# Patient Record
Sex: FEMALE | Race: WHITE | NOT HISPANIC OR LATINO | URBAN - METROPOLITAN AREA
[De-identification: names, ages, dates, MRNs, and addresses within clinical notes are randomized per-mention and may not be internally consistent; named-entity substitution may affect disease eponyms.]

---

## 2020-07-30 ENCOUNTER — EMERGENCY (EMERGENCY)
Facility: HOSPITAL | Age: 65
LOS: 0 days | Discharge: HOME | End: 2020-07-30
Attending: EMERGENCY MEDICINE | Admitting: EMERGENCY MEDICINE
Payer: MEDICARE

## 2020-07-30 VITALS
TEMPERATURE: 98 F | RESPIRATION RATE: 18 BRPM | HEART RATE: 78 BPM | SYSTOLIC BLOOD PRESSURE: 160 MMHG | DIASTOLIC BLOOD PRESSURE: 82 MMHG | OXYGEN SATURATION: 98 %

## 2020-07-30 VITALS
TEMPERATURE: 98 F | OXYGEN SATURATION: 97 % | RESPIRATION RATE: 16 BRPM | HEIGHT: 62 IN | HEART RATE: 89 BPM | DIASTOLIC BLOOD PRESSURE: 96 MMHG | SYSTOLIC BLOOD PRESSURE: 179 MMHG | WEIGHT: 190.04 LBS
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
## 2020-07-30 DIAGNOSIS — Z94.1 HEART TRANSPLANT STATUS: Chronic | ICD-10-CM

## 2020-07-30 DIAGNOSIS — I25.10 ATHEROSCLEROTIC HEART DISEASE OF NATIVE CORONARY ARTERY WITHOUT ANGINA PECTORIS: ICD-10-CM

## 2020-07-30 DIAGNOSIS — E78.5 HYPERLIPIDEMIA, UNSPECIFIED: ICD-10-CM

## 2020-07-30 DIAGNOSIS — Z94.1 HEART TRANSPLANT STATUS: ICD-10-CM
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
## 2020-07-30 DIAGNOSIS — Z79.82 LONG TERM (CURRENT) USE OF ASPIRIN: ICD-10-CM

## 2020-07-30 DIAGNOSIS — Z79.02 LONG TERM (CURRENT) USE OF ANTITHROMBOTICS/ANTIPLATELETS: ICD-10-CM

## 2020-07-30 DIAGNOSIS — E03.9 HYPOTHYROIDISM, UNSPECIFIED: ICD-10-CM

## 2020-07-30 DIAGNOSIS — S80.12XA CONTUSION OF LEFT LOWER LEG, INITIAL ENCOUNTER: ICD-10-CM

## 2020-07-30 DIAGNOSIS — M25.471 EFFUSION, RIGHT ANKLE: ICD-10-CM

## 2020-07-30 DIAGNOSIS — Z79.52 LONG TERM (CURRENT) USE OF SYSTEMIC STEROIDS: ICD-10-CM

## 2020-07-30 PROCEDURE — 93970 EXTREMITY STUDY: CPT | Mod: 26

## 2020-07-30 PROCEDURE — 73630 X-RAY EXAM OF FOOT: CPT | Mod: 26,LT

## 2020-07-30 PROCEDURE — 99284 EMERGENCY DEPT VISIT MOD MDM: CPT

## 2020-07-30 PROCEDURE — 73590 X-RAY EXAM OF LOWER LEG: CPT | Mod: 26,LT

## 2020-07-30 RX ORDER — VALGANCICLOVIR 450 MG/1
2 TABLET, FILM COATED ORAL
Qty: 0 | Refills: 0 | DISCHARGE

## 2020-07-30 RX ORDER — MAGNESIUM OXIDE 400 MG ORAL TABLET 241.3 MG
1 TABLET ORAL
Qty: 0 | Refills: 0 | DISCHARGE

## 2020-07-30 RX ORDER — ESCITALOPRAM OXALATE 10 MG/1
1 TABLET, FILM COATED ORAL
Qty: 0 | Refills: 0 | DISCHARGE

## 2020-07-30 RX ORDER — TACROLIMUS 5 MG/1
2 CAPSULE ORAL
Qty: 0 | Refills: 0 | DISCHARGE

## 2020-07-30 RX ORDER — CLOPIDOGREL BISULFATE 75 MG/1
1 TABLET, FILM COATED ORAL
Qty: 0 | Refills: 0 | DISCHARGE

## 2020-07-30 RX ORDER — SIROLIMUS 1 MG/ML
2 SOLUTION ORAL
Qty: 0 | Refills: 0 | DISCHARGE

## 2020-07-30 RX ORDER — LEVOTHYROXINE SODIUM 125 MCG
1 TABLET ORAL
Qty: 0 | Refills: 0 | DISCHARGE

## 2020-07-30 RX ORDER — ASPIRIN/CALCIUM CARB/MAGNESIUM 324 MG
0 TABLET ORAL
Qty: 0 | Refills: 0 | DISCHARGE

## 2020-07-30 RX ORDER — ALPRAZOLAM 0.25 MG
1 TABLET ORAL
Qty: 0 | Refills: 0 | DISCHARGE

## 2020-07-30 NOTE — ED PROVIDER NOTE - CLINICAL SUMMARY MEDICAL DECISION MAKING FREE TEXT BOX
dvt, fx contemplated: unlikely after review of diagnostic testing. In my opinion, out patient treatment and follow up are appropriate.

## 2020-07-30 NOTE — ED PROVIDER NOTE - NSFOLLOWUPINSTRUCTIONS_ED_ALL_ED_FT
WHAT YOU NEED TO KNOW:    Leg edema is swelling caused by fluid buildup. Your legs may swell if you sit or stand for long periods of time, are pregnant, or are injured. Swelling may also occur if you have heart failure or circulation problems. This means that your heart does not pump blood through your body as it should.    DISCHARGE INSTRUCTIONS:    Self-care:     Elevate your legs: Raise your legs above the level of your heart as often as you can. This will help decrease swelling and pain. Prop your legs on pillows or blankets to keep them elevated comfortably.      Wear pressure stockings: These tight stockings put pressure on your legs to promote blood flow and prevent blood clots. Wear the stockings during the day. Do not wear them while you sleep.      Apply heat: Heat helps decrease pain and swelling. Apply heat on the area for 20 to 30 minutes every 2 hours for as many days as directed.       Stay active: Do not stand or sit for long periods of time. Ask your healthcare provider about the best exercise plan for you.      Eat healthy foods: Healthy foods include fruits, vegetables, whole-grain breads, low-fat dairy products, beans, lean meats, and fish. Ask if you need to be on a special diet. Limit salt. Salt will make your body hold even more fluid.    Follow up with your healthcare provider as directed: Write down your questions so you remember to ask them during your visits.     Contact your healthcare provider if:     You have a fever or feel more tired than usual.      The veins in your legs look larger than usual. They may look full or bulging.      Your legs itch or feel heavy.      You have red or white areas or sores on your legs. The skin may also appear dimpled or have indentations.      You are gaining weight.      You have trouble moving your ankles.      The swelling does not go away, or other parts of your body swell.      You have questions or concerns about your condition or care.    Return to the emergency department if:     You cannot walk.      You feel faint or confused.       Your skin turns blue or gray.      Your leg feels warm, tender, and painful. It may be swollen and red.      You have chest pain or trouble breathing that is worse when you lie down.      You suddenly feel lightheaded and have trouble breathing.      You have new and sudden chest pain. You may have more pain when you take deep breaths or cough. You may also cough up blood.

## 2020-07-30 NOTE — ED ADULT NURSE NOTE - NSIMPLEMENTINTERV_GEN_ALL_ED
Implemented All Universal Safety Interventions:  [place] to call system. Call bell, personal items and telephone within reach. Instruct patient to call for assistance. Room bathroom lighting operational. Non-slip footwear when patient is off stretcher. Physically safe environment: no spills, clutter or unnecessary equipment. Stretcher in lowest position, wheels locked, appropriate side rails in place.

## 2020-07-30 NOTE — ED PROVIDER NOTE - PHYSICAL EXAMINATION
msk: +sweling to left lateral lower leg, no bony tenderness at ankle . +TTP to left metatarsal base. good rom of toes. pulses in tact PT/DP. achilles in tact. no proximal tibial tenderness. good weight bearing    skin: +contusion to left lateral lower leg

## 2020-07-30 NOTE — ED PROVIDER NOTE - PATIENT PORTAL LINK FT
You can access the FollowMyHealth Patient Portal offered by [facility] by registering at the following website: [email or web address]. By joining [employer]’s FollowMyHealth portal, you will also be able to view your health information using other applications (apps) compatible with our system.

## 2020-07-30 NOTE — ED PROVIDER NOTE - OBJECTIVE STATEMENT
66 y/o female with hx of cardiac transplant presents with left leg swelling and pain. patient states symptoms worse over 4 days. patient denies any trauma. patient c/o throbbing pain. patient seen by pmd and sent to the Ed for evaluation . patient denies any tingling to toes. patient with hx of metatarsal fracture in past. patient deneis any cp, sob, fevers, rashes, streaking up legs. no groin pain . patient able to ambulate without difficulty .

## 2020-07-30 NOTE — ED PROVIDER NOTE - NS ED ROS FT
Review of Systems    Constitutional: (-) fever or chills  respiratory: (-) cough (-) shortness of breath  Cardiovascular: (-) syncope, palpitations or chest pain  Integumentary: (-) rash or painful lymph nodes  Neurological: (-) altered mental status, headache or head injury  msk: left leg swelling

## 2020-07-30 NOTE — ED PROVIDER NOTE - IMAGING STUDIES ADDITIONAL INFORMATION FREE TEXT
I personally reviewed the radiographs performed on this patient and used my review in my medical decision making. no fx.

## 2023-01-29 NOTE — ED ADULT NURSE NOTE - ALCOHOL PRE SCREEN (AUDIT - C)
Patient with dizziness, vision changes, feeling better at time of exam, concern for a posterior CVA, we will admit to telemetry with stroke protocol, obtain MRI, obtain echocardiogram and carotid ultrasound, consult Neurology, neuro checks and vital sign checks per stroke orders, NPO until passes bedside swallow, PT OT ST evaluation, give 325 mg aspirin now and 81 mg aspirin daily, start atorvastatin once daily, labetalol for blood pressure management, obtain lipid panel, hemoglobin A1c in a.m.     Statement Selected

## 2023-11-28 PROBLEM — F41.9 ANXIETY DISORDER, UNSPECIFIED: Chronic | Status: ACTIVE | Noted: 2020-07-30

## 2023-11-28 PROBLEM — I25.10 ATHEROSCLEROTIC HEART DISEASE OF NATIVE CORONARY ARTERY WITHOUT ANGINA PECTORIS: Chronic | Status: ACTIVE | Noted: 2020-07-30

## 2023-11-28 PROBLEM — E03.9 HYPOTHYROIDISM, UNSPECIFIED: Chronic | Status: ACTIVE | Noted: 2020-07-30

## 2023-11-28 PROBLEM — E78.5 HYPERLIPIDEMIA, UNSPECIFIED: Chronic | Status: ACTIVE | Noted: 2020-07-30

## 2023-11-29 ENCOUNTER — OUTPATIENT (OUTPATIENT)
Dept: OUTPATIENT SERVICES | Facility: HOSPITAL | Age: 68
LOS: 1 days | End: 2023-11-29
Payer: MEDICARE

## 2023-11-29 DIAGNOSIS — K86.2 CYST OF PANCREAS: ICD-10-CM

## 2023-11-29 DIAGNOSIS — Z94.1 HEART TRANSPLANT STATUS: Chronic | ICD-10-CM

## 2023-11-29 DIAGNOSIS — Z00.8 ENCOUNTER FOR OTHER GENERAL EXAMINATION: ICD-10-CM

## 2023-11-29 PROCEDURE — 74183 MRI ABD W/O CNTR FLWD CNTR: CPT

## 2023-11-29 PROCEDURE — A9579: CPT

## 2023-11-29 PROCEDURE — 74183 MRI ABD W/O CNTR FLWD CNTR: CPT | Mod: 26,MH

## 2023-11-30 DIAGNOSIS — K86.2 CYST OF PANCREAS: ICD-10-CM

## 2023-12-02 ENCOUNTER — TRANSCRIPTION ENCOUNTER (OUTPATIENT)
Age: 68
End: 2023-12-02

## 2024-10-10 ENCOUNTER — INPATIENT (INPATIENT)
Facility: HOSPITAL | Age: 69
LOS: 3 days | Discharge: HOME | DRG: 853 | End: 2024-10-14
Attending: INTERNAL MEDICINE | Admitting: UROLOGY
Payer: MEDICARE

## 2024-10-10 VITALS
OXYGEN SATURATION: 97 % | TEMPERATURE: 99 F | HEART RATE: 124 BPM | RESPIRATION RATE: 20 BRPM | WEIGHT: 169.98 LBS | SYSTOLIC BLOOD PRESSURE: 83 MMHG | HEIGHT: 61 IN | DIASTOLIC BLOOD PRESSURE: 53 MMHG

## 2024-10-10 DIAGNOSIS — Z94.1 HEART TRANSPLANT STATUS: Chronic | ICD-10-CM

## 2024-10-10 LAB
CA-I SERPL-SCNC: 1.12 MMOL/L — LOW (ref 1.15–1.33)
GAS PNL BLDV: 135 MMOL/L — LOW (ref 136–145)
GAS PNL BLDV: SIGNIFICANT CHANGE UP
HCO3 BLDV-SCNC: 25 MMOL/L — SIGNIFICANT CHANGE UP (ref 22–29)
HCT VFR BLD CALC: 45.1 % — SIGNIFICANT CHANGE UP (ref 37–47)
HCT VFR BLDA CALC: 45 % — SIGNIFICANT CHANGE UP (ref 34.5–46.5)
HGB BLD CALC-MCNC: 14.9 G/DL — SIGNIFICANT CHANGE UP (ref 11.7–16.1)
HGB BLD-MCNC: 14.4 G/DL — SIGNIFICANT CHANGE UP (ref 12–16)
LACTATE BLDV-MCNC: 3.3 MMOL/L — HIGH (ref 0.5–2)
MCHC RBC-ENTMCNC: 29.4 PG — SIGNIFICANT CHANGE UP (ref 27–31)
MCHC RBC-ENTMCNC: 31.9 G/DL — LOW (ref 32–37)
MCV RBC AUTO: 92 FL — SIGNIFICANT CHANGE UP (ref 81–99)
PCO2 BLDV: 43 MMHG — HIGH (ref 39–42)
PH BLDV: 7.38 — SIGNIFICANT CHANGE UP (ref 7.32–7.43)
PLATELET # BLD AUTO: 158 K/UL — SIGNIFICANT CHANGE UP (ref 130–400)
PMV BLD: 9.9 FL — SIGNIFICANT CHANGE UP (ref 7.4–10.4)
PO2 BLDV: 24 MMHG — LOW (ref 25–45)
POTASSIUM BLDV-SCNC: 3.6 MMOL/L — SIGNIFICANT CHANGE UP (ref 3.5–5.1)
RBC # BLD: 4.9 M/UL — SIGNIFICANT CHANGE UP (ref 4.2–5.4)
RBC # FLD: 13.2 % — SIGNIFICANT CHANGE UP (ref 11.5–14.5)
SAO2 % BLDV: 39.5 % — LOW (ref 67–88)
WBC # BLD: 2.59 K/UL — LOW (ref 4.8–10.8)
WBC # FLD AUTO: 2.59 K/UL — LOW (ref 4.8–10.8)

## 2024-10-10 PROCEDURE — 93010 ELECTROCARDIOGRAM REPORT: CPT

## 2024-10-10 PROCEDURE — 99285 EMERGENCY DEPT VISIT HI MDM: CPT | Mod: FS

## 2024-10-10 RX ORDER — SODIUM CHLORIDE 0.9 % (FLUSH) 0.9 %
2100 SYRINGE (ML) INJECTION ONCE
Refills: 0 | Status: COMPLETED | OUTPATIENT
Start: 2024-10-10 | End: 2024-10-10

## 2024-10-10 RX ORDER — PIPERACILLIN SODIUM AND TAZOBACTAM SODIUM 12; 1.5 G/60ML; G/60ML
3.38 INJECTION, POWDER, LYOPHILIZED, FOR SOLUTION INTRAVENOUS ONCE
Refills: 0 | Status: COMPLETED | OUTPATIENT
Start: 2024-10-10 | End: 2024-10-10

## 2024-10-10 RX ORDER — ACETAMINOPHEN 325 MG
975 TABLET ORAL ONCE
Refills: 0 | Status: COMPLETED | OUTPATIENT
Start: 2024-10-10 | End: 2024-10-10

## 2024-10-11 DIAGNOSIS — A41.9 SEPSIS, UNSPECIFIED ORGANISM: ICD-10-CM

## 2024-10-11 LAB
ALBUMIN SERPL ELPH-MCNC: 3.8 G/DL — SIGNIFICANT CHANGE UP (ref 3.5–5.2)
ALP SERPL-CCNC: 105 U/L — SIGNIFICANT CHANGE UP (ref 30–115)
ALT FLD-CCNC: 19 U/L — SIGNIFICANT CHANGE UP (ref 0–41)
ANION GAP SERPL CALC-SCNC: 14 MMOL/L — SIGNIFICANT CHANGE UP (ref 7–14)
APPEARANCE UR: ABNORMAL
APTT BLD: 26.4 SEC — LOW (ref 27–39.2)
AST SERPL-CCNC: 25 U/L — SIGNIFICANT CHANGE UP (ref 0–41)
BACTERIA # UR AUTO: ABNORMAL /HPF
BASOPHILS # BLD AUTO: 0.01 K/UL — SIGNIFICANT CHANGE UP (ref 0–0.2)
BASOPHILS NFR BLD AUTO: 0.4 % — SIGNIFICANT CHANGE UP (ref 0–1)
BILIRUB SERPL-MCNC: 1.3 MG/DL — HIGH (ref 0.2–1.2)
BILIRUB UR-MCNC: NEGATIVE — SIGNIFICANT CHANGE UP
BLD GP AB SCN SERPL QL: SIGNIFICANT CHANGE UP
BUN SERPL-MCNC: 19 MG/DL — SIGNIFICANT CHANGE UP (ref 10–20)
CALCIUM SERPL-MCNC: 8.8 MG/DL — SIGNIFICANT CHANGE UP (ref 8.4–10.5)
CHLORIDE SERPL-SCNC: 103 MMOL/L — SIGNIFICANT CHANGE UP (ref 98–110)
CO2 SERPL-SCNC: 22 MMOL/L — SIGNIFICANT CHANGE UP (ref 17–32)
COLOR SPEC: YELLOW — SIGNIFICANT CHANGE UP
CREAT SERPL-MCNC: 1.1 MG/DL — SIGNIFICANT CHANGE UP (ref 0.7–1.5)
DIFF PNL FLD: ABNORMAL
E COLI DNA BLD POS QL NAA+NON-PROBE: SIGNIFICANT CHANGE UP
EGFR: 55 ML/MIN/1.73M2 — LOW
EOSINOPHIL # BLD AUTO: 0.01 K/UL — SIGNIFICANT CHANGE UP (ref 0–0.7)
EOSINOPHIL NFR BLD AUTO: 0.4 % — SIGNIFICANT CHANGE UP (ref 0–8)
EPI CELLS # UR: PRESENT
FLUAV AG NPH QL: SIGNIFICANT CHANGE UP
FLUBV AG NPH QL: SIGNIFICANT CHANGE UP
GLUCOSE SERPL-MCNC: 168 MG/DL — HIGH (ref 70–99)
GLUCOSE UR QL: NEGATIVE MG/DL — SIGNIFICANT CHANGE UP
GRAM STN FLD: ABNORMAL
IMM GRANULOCYTES NFR BLD AUTO: 0.4 % — HIGH (ref 0.1–0.3)
INR BLD: 1.24 RATIO — SIGNIFICANT CHANGE UP (ref 0.65–1.3)
KETONES UR-MCNC: NEGATIVE MG/DL — SIGNIFICANT CHANGE UP
LACTATE SERPL-SCNC: 2.2 MMOL/L — HIGH (ref 0.7–2)
LACTATE SERPL-SCNC: 3.1 MMOL/L — HIGH (ref 0.7–2)
LEUKOCYTE ESTERASE UR-ACNC: ABNORMAL
LIDOCAIN IGE QN: 17 U/L — SIGNIFICANT CHANGE UP (ref 7–60)
LYMPHOCYTES # BLD AUTO: 0.32 K/UL — LOW (ref 1.2–3.4)
LYMPHOCYTES # BLD AUTO: 12.4 % — LOW (ref 20.5–51.1)
METHOD TYPE: SIGNIFICANT CHANGE UP
MONOCYTES # BLD AUTO: 0.02 K/UL — LOW (ref 0.1–0.6)
MONOCYTES NFR BLD AUTO: 0.8 % — LOW (ref 1.7–9.3)
NEUTROPHILS # BLD AUTO: 2.22 K/UL — SIGNIFICANT CHANGE UP (ref 1.4–6.5)
NEUTROPHILS NFR BLD AUTO: 85.6 % — HIGH (ref 42.2–75.2)
NITRITE UR-MCNC: POSITIVE
NRBC # BLD: 0 /100 WBCS — SIGNIFICANT CHANGE UP (ref 0–0)
PH UR: 5.5 — SIGNIFICANT CHANGE UP (ref 5–8)
POTASSIUM SERPL-MCNC: 4.3 MMOL/L — SIGNIFICANT CHANGE UP (ref 3.5–5)
POTASSIUM SERPL-SCNC: 4.3 MMOL/L — SIGNIFICANT CHANGE UP (ref 3.5–5)
PROT SERPL-MCNC: 6.2 G/DL — SIGNIFICANT CHANGE UP (ref 6–8)
PROT UR-MCNC: 30 MG/DL
PROTHROM AB SERPL-ACNC: 14.2 SEC — HIGH (ref 9.95–12.87)
RBC CASTS # UR COMP ASSIST: >50 /HPF — HIGH (ref 0–4)
RSV RNA NPH QL NAA+NON-PROBE: SIGNIFICANT CHANGE UP
SARS-COV-2 RNA SPEC QL NAA+PROBE: SIGNIFICANT CHANGE UP
SODIUM SERPL-SCNC: 139 MMOL/L — SIGNIFICANT CHANGE UP (ref 135–146)
SP GR SPEC: >1.03 — HIGH (ref 1–1.03)
SPECIMEN SOURCE: SIGNIFICANT CHANGE UP
SPECIMEN SOURCE: SIGNIFICANT CHANGE UP
SQUAMOUS # UR AUTO: SIGNIFICANT CHANGE UP /HPF (ref 0–5)
UROBILINOGEN FLD QL: 0.2 MG/DL — SIGNIFICANT CHANGE UP (ref 0.2–1)
WBC UR QL: 29 /HPF — HIGH (ref 0–5)

## 2024-10-11 PROCEDURE — 85730 THROMBOPLASTIN TIME PARTIAL: CPT

## 2024-10-11 PROCEDURE — 86664 EPSTEIN-BARR NUCLEAR ANTIGEN: CPT

## 2024-10-11 PROCEDURE — 87186 SC STD MICRODIL/AGAR DIL: CPT

## 2024-10-11 PROCEDURE — 86901 BLOOD TYPING SEROLOGIC RH(D): CPT

## 2024-10-11 PROCEDURE — 74177 CT ABD & PELVIS W/CONTRAST: CPT | Mod: 26,MC

## 2024-10-11 PROCEDURE — 52332 CYSTOSCOPY AND TREATMENT: CPT | Mod: RT

## 2024-10-11 PROCEDURE — 86663 EPSTEIN-BARR ANTIBODY: CPT

## 2024-10-11 PROCEDURE — C1769: CPT

## 2024-10-11 PROCEDURE — 84100 ASSAY OF PHOSPHORUS: CPT

## 2024-10-11 PROCEDURE — 72170 X-RAY EXAM OF PELVIS: CPT

## 2024-10-11 PROCEDURE — 87086 URINE CULTURE/COLONY COUNT: CPT

## 2024-10-11 PROCEDURE — 86777 TOXOPLASMA ANTIBODY: CPT

## 2024-10-11 PROCEDURE — 83605 ASSAY OF LACTIC ACID: CPT

## 2024-10-11 PROCEDURE — 93306 TTE W/DOPPLER COMPLETE: CPT

## 2024-10-11 PROCEDURE — 85610 PROTHROMBIN TIME: CPT

## 2024-10-11 PROCEDURE — 80053 COMPREHEN METABOLIC PANEL: CPT

## 2024-10-11 PROCEDURE — 81001 URINALYSIS AUTO W/SCOPE: CPT

## 2024-10-11 PROCEDURE — 71045 X-RAY EXAM CHEST 1 VIEW: CPT

## 2024-10-11 PROCEDURE — 86644 CMV ANTIBODY: CPT

## 2024-10-11 PROCEDURE — 99223 1ST HOSP IP/OBS HIGH 75: CPT | Mod: FS

## 2024-10-11 PROCEDURE — 83735 ASSAY OF MAGNESIUM: CPT

## 2024-10-11 PROCEDURE — 86665 EPSTEIN-BARR CAPSID VCA: CPT

## 2024-10-11 PROCEDURE — 99221 1ST HOSP IP/OBS SF/LOW 40: CPT

## 2024-10-11 PROCEDURE — 85025 COMPLETE CBC W/AUTO DIFF WBC: CPT

## 2024-10-11 PROCEDURE — 86900 BLOOD TYPING SEROLOGIC ABO: CPT

## 2024-10-11 PROCEDURE — 86850 RBC ANTIBODY SCREEN: CPT

## 2024-10-11 PROCEDURE — 71045 X-RAY EXAM CHEST 1 VIEW: CPT | Mod: 26

## 2024-10-11 PROCEDURE — C2617: CPT

## 2024-10-11 PROCEDURE — 36415 COLL VENOUS BLD VENIPUNCTURE: CPT

## 2024-10-11 PROCEDURE — 80197 ASSAY OF TACROLIMUS: CPT

## 2024-10-11 PROCEDURE — 85027 COMPLETE CBC AUTOMATED: CPT

## 2024-10-11 RX ORDER — SEMAGLUTIDE 1.34 MG/ML
1 INJECTION, SOLUTION SUBCUTANEOUS
Refills: 0 | DISCHARGE

## 2024-10-11 RX ORDER — ONDANSETRON HCL/PF 4 MG/2 ML
4 VIAL (ML) INJECTION EVERY 6 HOURS
Refills: 0 | Status: DISCONTINUED | OUTPATIENT
Start: 2024-10-11 | End: 2024-10-11

## 2024-10-11 RX ORDER — PIPERACILLIN SODIUM AND TAZOBACTAM SODIUM 12; 1.5 G/60ML; G/60ML
3.38 INJECTION, POWDER, LYOPHILIZED, FOR SOLUTION INTRAVENOUS EVERY 8 HOURS
Refills: 0 | Status: DISCONTINUED | OUTPATIENT
Start: 2024-10-11 | End: 2024-10-13

## 2024-10-11 RX ORDER — TACROLIMUS 4 MG/1
1.5 TABLET, EXTENDED RELEASE ORAL
Refills: 0 | DISCHARGE

## 2024-10-11 RX ORDER — FAMOTIDINE 40 MG
1 TABLET ORAL
Refills: 0 | DISCHARGE

## 2024-10-11 RX ORDER — SODIUM CHLORIDE 0.9 % (FLUSH) 0.9 %
1000 SYRINGE (ML) INJECTION
Refills: 0 | Status: DISCONTINUED | OUTPATIENT
Start: 2024-10-11 | End: 2024-10-12

## 2024-10-11 RX ORDER — SODIUM CHLORIDE 0.9 % (FLUSH) 0.9 %
1000 SYRINGE (ML) INJECTION
Refills: 0 | Status: DISCONTINUED | OUTPATIENT
Start: 2024-10-11 | End: 2024-10-11

## 2024-10-11 RX ORDER — MYCOPHENOLATE MOFETIL 500 MG/1
540 TABLET ORAL
Refills: 0 | Status: DISCONTINUED | OUTPATIENT
Start: 2024-10-11 | End: 2024-10-11

## 2024-10-11 RX ORDER — AMLODIPINE BESYLATE 5 MG
1 TABLET ORAL
Refills: 0 | DISCHARGE

## 2024-10-11 RX ORDER — ROSUVASTATIN CALCIUM 20 MG/1
1 TABLET, COATED ORAL
Refills: 0 | DISCHARGE

## 2024-10-11 RX ORDER — ASPIRIN 325 MG
81 TABLET ORAL DAILY
Refills: 0 | Status: DISCONTINUED | OUTPATIENT
Start: 2024-10-11 | End: 2024-10-11

## 2024-10-11 RX ORDER — SODIUM CHLORIDE IRRIG SOLUTION 0.9 %
1000 SOLUTION, IRRIGATION IRRIGATION
Refills: 0 | Status: DISCONTINUED | OUTPATIENT
Start: 2024-10-11 | End: 2024-10-11

## 2024-10-11 RX ORDER — CEFTRIAXONE SODIUM 1 G
1000 VIAL (EA) INJECTION EVERY 24 HOURS
Refills: 0 | Status: DISCONTINUED | OUTPATIENT
Start: 2024-10-11 | End: 2024-10-11

## 2024-10-11 RX ORDER — ESCITALOPRAM OXALATE 10 MG
10 TABLET ORAL DAILY
Refills: 0 | Status: DISCONTINUED | OUTPATIENT
Start: 2024-10-11 | End: 2024-10-11

## 2024-10-11 RX ORDER — ASPIRIN 325 MG
81 TABLET ORAL DAILY
Refills: 0 | Status: DISCONTINUED | OUTPATIENT
Start: 2024-10-11 | End: 2024-10-14

## 2024-10-11 RX ORDER — MORPHINE SULFATE 30 MG/1
2 TABLET, FILM COATED, EXTENDED RELEASE ORAL
Refills: 0 | Status: DISCONTINUED | OUTPATIENT
Start: 2024-10-11 | End: 2024-10-11

## 2024-10-11 RX ORDER — ATORVASTATIN CALCIUM 10 MG/1
10 TABLET, FILM COATED ORAL AT BEDTIME
Refills: 0 | Status: DISCONTINUED | OUTPATIENT
Start: 2024-10-11 | End: 2024-10-14

## 2024-10-11 RX ORDER — ONDANSETRON HCL/PF 4 MG/2 ML
4 VIAL (ML) INJECTION ONCE
Refills: 0 | Status: DISCONTINUED | OUTPATIENT
Start: 2024-10-11 | End: 2024-10-11

## 2024-10-11 RX ORDER — CHLORHEXIDINE GLUCONATE ORAL RINSE 1.2 MG/ML
1 SOLUTION DENTAL
Refills: 0 | Status: DISCONTINUED | OUTPATIENT
Start: 2024-10-11 | End: 2024-10-14

## 2024-10-11 RX ORDER — TACROLIMUS 4 MG/1
1.5 TABLET, EXTENDED RELEASE ORAL
Refills: 0 | Status: DISCONTINUED | OUTPATIENT
Start: 2024-10-11 | End: 2024-10-14

## 2024-10-11 RX ORDER — MYCOPHENOLIC ACID 180 MG/1
540 TABLET, DELAYED RELEASE ORAL
Refills: 0 | Status: DISCONTINUED | OUTPATIENT
Start: 2024-10-11 | End: 2024-10-14

## 2024-10-11 RX ORDER — SODIUM CHLORIDE 0.9 % (FLUSH) 0.9 %
1000 SYRINGE (ML) INJECTION ONCE
Refills: 0 | Status: COMPLETED | OUTPATIENT
Start: 2024-10-11 | End: 2024-10-11

## 2024-10-11 RX ORDER — ALPRAZOLAM 0.5 MG/1
0.25 TABLET ORAL ONCE
Refills: 0 | Status: DISCONTINUED | OUTPATIENT
Start: 2024-10-11 | End: 2024-10-11

## 2024-10-11 RX ORDER — HYDROCORTISONE 5 MG/1
100 TABLET ORAL THREE TIMES A DAY
Refills: 0 | Status: DISCONTINUED | OUTPATIENT
Start: 2024-10-11 | End: 2024-10-13

## 2024-10-11 RX ORDER — HYDROMORPHONE HYDROCHLORIDE 1 MG/ML
0.5 INJECTION, SOLUTION INTRAMUSCULAR; INTRAVENOUS; SUBCUTANEOUS
Refills: 0 | Status: DISCONTINUED | OUTPATIENT
Start: 2024-10-11 | End: 2024-10-11

## 2024-10-11 RX ORDER — FAMOTIDINE 40 MG
20 TABLET ORAL DAILY
Refills: 0 | Status: DISCONTINUED | OUTPATIENT
Start: 2024-10-11 | End: 2024-10-14

## 2024-10-11 RX ORDER — ACETAMINOPHEN 325 MG
650 TABLET ORAL EVERY 6 HOURS
Refills: 0 | Status: DISCONTINUED | OUTPATIENT
Start: 2024-10-11 | End: 2024-10-11

## 2024-10-11 RX ORDER — PREDNISONE 5 MG/1
2.5 TABLET ORAL DAILY
Refills: 0 | Status: DISCONTINUED | OUTPATIENT
Start: 2024-10-11 | End: 2024-10-11

## 2024-10-11 RX ORDER — ROSUVASTATIN CALCIUM 20 MG/1
20 TABLET, COATED ORAL AT BEDTIME
Refills: 0 | Status: DISCONTINUED | OUTPATIENT
Start: 2024-10-11 | End: 2024-10-14

## 2024-10-11 RX ORDER — SIROLIMUS 1 MG/1
2 TABLET, FILM COATED ORAL DAILY
Refills: 0 | Status: DISCONTINUED | OUTPATIENT
Start: 2024-10-11 | End: 2024-10-11

## 2024-10-11 RX ORDER — MYCOPHENOLATE MOFETIL 500 MG/1
540 TABLET ORAL
Refills: 0 | DISCHARGE

## 2024-10-11 RX ADMIN — Medication 5000 UNIT(S): at 06:13

## 2024-10-11 RX ADMIN — Medication 975 MILLIGRAM(S): at 00:17

## 2024-10-11 RX ADMIN — PIPERACILLIN SODIUM AND TAZOBACTAM SODIUM 25 GRAM(S): 12; 1.5 INJECTION, POWDER, LYOPHILIZED, FOR SOLUTION INTRAVENOUS at 06:12

## 2024-10-11 RX ADMIN — Medication 2100 MILLILITER(S): at 01:20

## 2024-10-11 RX ADMIN — Medication 20 MILLIGRAM(S): at 12:47

## 2024-10-11 RX ADMIN — HYDROCORTISONE 100 MILLIGRAM(S): 5 TABLET ORAL at 21:27

## 2024-10-11 RX ADMIN — PIPERACILLIN SODIUM AND TAZOBACTAM SODIUM 25 GRAM(S): 12; 1.5 INJECTION, POWDER, LYOPHILIZED, FOR SOLUTION INTRAVENOUS at 21:27

## 2024-10-11 RX ADMIN — Medication 137 MICROGRAM(S): at 06:13

## 2024-10-11 RX ADMIN — MYCOPHENOLIC ACID 540 MILLIGRAM(S): 180 TABLET, DELAYED RELEASE ORAL at 16:56

## 2024-10-11 RX ADMIN — PIPERACILLIN SODIUM AND TAZOBACTAM SODIUM 200 GRAM(S): 12; 1.5 INJECTION, POWDER, LYOPHILIZED, FOR SOLUTION INTRAVENOUS at 00:18

## 2024-10-11 RX ADMIN — TACROLIMUS 1.5 MILLIGRAM(S): 4 TABLET, EXTENDED RELEASE ORAL at 16:56

## 2024-10-11 RX ADMIN — Medication 100 MILLILITER(S): at 08:05

## 2024-10-11 RX ADMIN — Medication 81 MILLIGRAM(S): at 12:48

## 2024-10-11 RX ADMIN — HYDROCORTISONE 100 MILLIGRAM(S): 5 TABLET ORAL at 13:21

## 2024-10-11 RX ADMIN — Medication 2100 MILLILITER(S): at 00:18

## 2024-10-11 RX ADMIN — ROSUVASTATIN CALCIUM 20 MILLIGRAM(S): 20 TABLET, COATED ORAL at 21:27

## 2024-10-11 RX ADMIN — PIPERACILLIN SODIUM AND TAZOBACTAM SODIUM 25 GRAM(S): 12; 1.5 INJECTION, POWDER, LYOPHILIZED, FOR SOLUTION INTRAVENOUS at 13:20

## 2024-10-11 RX ADMIN — Medication 75 MILLILITER(S): at 03:42

## 2024-10-11 RX ADMIN — Medication 5000 UNIT(S): at 16:56

## 2024-10-11 RX ADMIN — HYDROCORTISONE 100 MILLIGRAM(S): 5 TABLET ORAL at 06:13

## 2024-10-11 RX ADMIN — CHLORHEXIDINE GLUCONATE ORAL RINSE 1 APPLICATION(S): 1.2 SOLUTION DENTAL at 06:12

## 2024-10-11 RX ADMIN — Medication 100 MILLILITER(S): at 20:03

## 2024-10-11 RX ADMIN — Medication 1000 MILLILITER(S): at 02:01

## 2024-10-11 NOTE — CONSULT NOTE ADULT - SUBJECTIVE AND OBJECTIVE BOX
CARDIOLOGY CONSULT NOTE     CHIEF COMPLAINT/REASON FOR CONSULT:    HPI:  69-year-old female PMH CAD S/p heart transplant 7 years ago, HTN,  anxiety, hypothyroidism, Hysterectomy, HLD presenting to ED for evaluation of generalized weakness, fever, chills, nausea, vomiting, abdominal pain over the past 2 days.  Son reporting patient has been increasingly weak.  Denies chest pain, shortness of breath and hematuria.  Pt admits to dysuria.  (11 Oct 2024 01:40)      PAST MEDICAL & SURGICAL HISTORY:  Anxiety      Coronary artery disease      Hyperlipidemia      Hypothyroid      H/O heart transplant          Cardiac Risks:   [ ]HTN, [ ] DM, [ ] Smoking, [ ] FH,  [ ] Lipids        MEDICATIONS:  MEDICATIONS  (STANDING):  aspirin  chewable 81 milliGRAM(s) Oral daily  atorvastatin 10 milliGRAM(s) Oral at bedtime  chlorhexidine 2% Cloths 1 Application(s) Topical <User Schedule>  clopidogrel Tablet 75 milliGRAM(s) Oral daily  escitalopram 10 milliGRAM(s) Oral daily  heparin   Injectable 5000 Unit(s) SubCutaneous every 12 hours  hydrocortisone sodium succinate Injectable 100 milliGRAM(s) IV Push three times a day  levothyroxine 137 MICROGram(s) Oral daily  piperacillin/tazobactam IVPB.. 3.375 Gram(s) IV Intermittent every 8 hours  sirolimus 2 milliGRAM(s) Oral daily  sodium chloride 0.9%. 1000 milliLiter(s) (100 mL/Hr) IV Continuous <Continuous>      FAMILY HISTORY:      SOCIAL HISTORY:      [ ] Marital status  Allergies    No Known Allergies    Intolerances    	    REVIEW OF SYSTEMS:  CONSTITUTIONAL: No fever, weight loss, or fatigue  EYES: No eye pain, visual disturbances, or discharge  ENMT:  No difficulty hearing, tinnitus, vertigo; No sinus or throat pain  NECK: No pain or stiffness  RESPIRATORY: No cough, wheezing, chills or hemoptysis; No Shortness of Breath  CARDIOVASCULAR: No chest pain, palpitations, passing out, dizziness, or leg swelling  GASTROINTESTINAL: No abdominal or epigastric pain. No nausea, vomiting, or hematemesis; No diarrhea or constipation. No melena or hematochezia.  GENITOURINARY: No dysuria, frequency, hematuria, or incontinence  NEUROLOGICAL: No headaches, memory loss, loss of strength, numbness, or tremors  SKIN: No itching, burning, rashes, or lesions   	        PHYSICAL EXAM:  T(C): 36.7 (10-11-24 @ 07:13), Max: 39.8 (10-10-24 @ 23:31)  HR: 95 (10-11-24 @ 05:00) (89 - 124)  BP: 89/52 (10-11-24 @ 05:00) (64/33 - 143/67)  RR: 23 (10-11-24 @ 07:13) (16 - 23)  SpO2: 96% (10-11-24 @ 07:13) (95% - 98%)  Wt(kg): --  I&O's Summary    10 Oct 2024 07:01  -  11 Oct 2024 07:00  --------------------------------------------------------  IN: 0 mL / OUT: 550 mL / NET: -550 mL    11 Oct 2024 07:01  -  11 Oct 2024 08:13  --------------------------------------------------------  IN: 0 mL / OUT: 200 mL / NET: -200 mL        Appearance: Normal	  Psychiatry: A & O x 3, Mood & affect appropriate  HEENT:   Normal oral mucosa, PERRL, EOMI	  Lymphatic: No lymphadenopathy  Cardiovascular: Normal S1 S2,RRR, No JVD, No murmurs  Respiratory: Lungs clear to auscultation	  Gastrointestinal:  Soft, Non-tender, + BS	  Skin: No rashes, No ecchymoses, No cyanosis	  Neurologic: Non-focal  Extremities: Normal range of motion, No clubbing, cyanosis or edema  Vascular: Peripheral pulses palpable 2+ bilaterally      ECG:  CARDIOLOGY CONSULT NOTE     CHIEF COMPLAINT/REASON FOR CONSULT:    HPI:  69-year-old female PMH CAD S/p heart transplant 7 years ago, HTN,  anxiety, hypothyroidism, Hysterectomy, HLD presenting to ED for evaluation of generalized weakness, fever, chills, nausea, vomiting, abdominal pain over the past 2 days.  Son reporting patient has been increasingly weak.  Denies chest pain, shortness of breath and hematuria.  Pt admits to dysuria.  (11 Oct 2024 01:40)      PAST MEDICAL & SURGICAL HISTORY:  Anxiety      Coronary artery disease      Hyperlipidemia      Hypothyroid      H/O heart transplant          Cardiac Risks:   [ ]HTN, [ ] DM, [ ] Smoking, [ ] FH,  [x ] Lipids        MEDICATIONS:  MEDICATIONS  (STANDING):  aspirin  chewable 81 milliGRAM(s) Oral daily  atorvastatin 10 milliGRAM(s) Oral at bedtime  chlorhexidine 2% Cloths 1 Application(s) Topical <User Schedule>  clopidogrel Tablet 75 milliGRAM(s) Oral daily  escitalopram 10 milliGRAM(s) Oral daily  heparin   Injectable 5000 Unit(s) SubCutaneous every 12 hours  hydrocortisone sodium succinate Injectable 100 milliGRAM(s) IV Push three times a day  levothyroxine 137 MICROGram(s) Oral daily  piperacillin/tazobactam IVPB.. 3.375 Gram(s) IV Intermittent every 8 hours  sirolimus 2 milliGRAM(s) Oral daily  sodium chloride 0.9%. 1000 milliLiter(s) (100 mL/Hr) IV Continuous <Continuous>      FAMILY HISTORY:      SOCIAL HISTORY:        Allergies    No Known Allergies        	    REVIEW OF SYSTEMS:  CONSTITUTIONAL: No fever, weight loss, or fatigue  EYES: No eye pain, visual disturbances, or discharge  ENMT:  No difficulty hearing, tinnitus, vertigo; No sinus or throat pain  NECK: No pain or stiffness  RESPIRATORY: No cough, wheezing, chills or hemoptysis; No Shortness of Breath  CARDIOVASCULAR: No chest pain, palpitations, passing out, dizziness, or leg swelling  GASTROINTESTINAL: No abdominal or epigastric pain. No nausea, vomiting, or hematemesis; No diarrhea or constipation. No melena or hematochezia.  GENITOURINARY: No dysuria, frequency, hematuria, or incontinence  NEUROLOGICAL: No headaches, memory loss, loss of strength, numbness, or tremors  SKIN: No itching, burning, rashes, or lesions   	        PHYSICAL EXAM:  T(C): 36.7 (10-11-24 @ 07:13), Max: 39.8 (10-10-24 @ 23:31)  HR: 95 (10-11-24 @ 05:00) (89 - 124)  BP: 89/52 (10-11-24 @ 05:00) (64/33 - 143/67)  RR: 23 (10-11-24 @ 07:13) (16 - 23)  SpO2: 96% (10-11-24 @ 07:13) (95% - 98%)  Wt(kg): --  I&O's Summary    10 Oct 2024 07:01  -  11 Oct 2024 07:00  --------------------------------------------------------  IN: 0 mL / OUT: 550 mL / NET: -550 mL    11 Oct 2024 07:01  -  11 Oct 2024 08:14  --------------------------------------------------------  IN: 0 mL / OUT: 200 mL / NET: -200 mL        Appearance: Normal	  Psychiatry: A & O x 3, Mood & affect appropriate  HEENT:   Normal oral mucosa, PERRL, EOMI	  Lymphatic: No lymphadenopathy  Cardiovascular: Normal S1 S2,RRR, No JVD, No murmurs  Respiratory: Lungs clear to auscultation	  Gastrointestinal:  Soft, Non-tender, + BS	  Skin: No rashes, No ecchymoses, No cyanosis	  Neurologic: Non-focal  Extremities: Normal range of motion, No clubbing, cyanosis or edema  Vascular: Peripheral pulses palpable 2+ bilaterally      ECG:  	  < from: 12 Lead ECG (10.10.24 @ 23:26) >  Diagnosis Line Sinus tachycardia with frequent Premature ventricular complexes  Incomplete right bundle branch block  Left anterior fascicular block  Abnormal ECG    Confirmed by GENEVA CRONIN MD ([id number]) on 10/11/2024 6:47:46 AM    < end of copied text >    	  LABS:	 	    CARDIAC MARKERS:                                    14.4   2.59  )-----------( 158      ( 10 Oct 2024 23:49 )             45.1     10-10    139  |  103  |  19  ----------------------------<  168[H]  4.3   |  22  |  1.1    Ca    8.8      10 Oct 2024 23:49    TPro  6.2  /  Alb  3.8  /  TBili  1.3[H]  /  DBili  x   /  AST  25  /  ALT  19  /  AlkPhos  105  10-10    PT/INR - ( 11 Oct 2024 01:46 )   PT: 14.20 sec;   INR: 1.24 ratio         PTT - ( 11 Oct 2024 01:46 )  PTT:26.4 sec              	    	  LABS:	 	    CARDIAC MARKERS:                                    14.4   2.59  )-----------( 158      ( 10 Oct 2024 23:49 )             45.1     10-10    139  |  103  |  19  ----------------------------<  168[H]  4.3   |  22  |  1.1    Ca    8.8      10 Oct 2024 23:49    TPro  6.2  /  Alb  3.8  /  TBili  1.3[H]  /  DBili  x   /  AST  25  /  ALT  19  /  AlkPhos  105  10-10    PT/INR - ( 11 Oct 2024 01:46 )   PT: 14.20 sec;   INR: 1.24 ratio         PTT - ( 11 Oct 2024 01:46 )  PTT:26.4 sec

## 2024-10-11 NOTE — CONSULT NOTE ADULT - SUBJECTIVE AND OBJECTIVE BOX
Patient is a 69y old  Female who presents with a chief complaint of fever / chills s/p ureteral stent      REVIEW OF SYSTEMS  12 point system negative         PHYSICAL EXAM:  GENERAL: NAD, well-groomed, well-developed  HEAD:  Atraumatic, Normocephalic  EYES: EOMI, PERRLA, conjunctiva and sclera clear  ENMT: No tonsillar erythema, exudates, or enlargement; Moist mucous membranes, Good dentition, No lesions  NECK: Supple, No JVD, Normal thyroid  NERVOUS SYSTEM:  Alert & Oriented X3, Good concentration; Motor Strength 5/5 B/L upper and lower extremities; DTRs 2+ intact and symmetric  CHEST/LUNG: Clear to percussion bilaterally; No rales, rhonchi, wheezing, or rubs  HEART: Regular rate and rhythm; No murmurs, rubs, or gallops  ABDOMEN: Soft, Nontender, Nondistended; Bowel sounds present  EXTREMITIES:  2+ Peripheral Pulses, No clubbing, cyanosis, or edema      labs  10-10    139  |  103  |  19  ----------------------------<  168[H]  4.3   |  22  |  1.1    Ca    8.8      10 Oct 2024 23:49    TPro  6.2  /  Alb  3.8  /  TBili  1.3[H]  /  DBili  x   /  AST  25  /  ALT  19  /  AlkPhos  105  10-10                          14.4   2.59  )-----------( 158      ( 10 Oct 2024 23:49 )             45.1         PT/INR - ( 11 Oct 2024 01:46 )   PT: 14.20 sec;   INR: 1.24 ratio         PTT - ( 11 Oct 2024 01:46 )  PTT:26.4 sec

## 2024-10-11 NOTE — PROGRESS NOTE ADULT - ASSESSMENT
69-year-old female PMH CAD S/p heart transplant 7 years ago, HTN,  anxiety, hypothyroidism, Hysterectomy, HLD admitted for septic ureteral stone. WBC 2.59, Tmax 103.7 F, normal Cr 1.1, CT A/P: Mild right hydroureteronephrosis secondary to an obstructing calculus at  the UVJ measuring 2 x 3 x 3 mm..  #sepsis secondary to UTI  #Mild right hydroureteronephrosis secondary to an obstructing calculus at  the UVJ measuring  # s/p Cystoscopic insertion of ureteral stent this morning  - IV hydration  - IV abx  - UA/urine C/S    #HTN  -hold bp meds    #Hypothyroidism  - On synthroid     #HLD/CAD  - On lipitor 20mg HS      #s/p heart transplant  -c/w mycophenolate     #GI and DVT prophylaxis  -d/w Dr Edmondson 69-year-old female PMH CAD S/p heart transplant 7 years ago, HTN,  anxiety, hypothyroidism, Hysterectomy, HLD admitted for septic ureteral stone. WBC 2.59, Tmax 103.7 F, normal Cr 1.1, CT A/P: Mild right hydroureteronephrosis secondary to an obstructing calculus at  the UVJ measuring 2 x 3 x 3 mm..  #sepsis secondary to UTI  #Mild right hydroureteronephrosis secondary to an obstructing calculus at  the UVJ measuring  # s/p Cystoscopic insertion of ureteral stent this morning  - IV hydration  - IV abx  - UA/urine C/S    #HTN  -hold bp meds    #Hypothyroidism  - On synthroid     #HLD/CAD  - On lipitor 20mg HS      #s/p heart transplant  -c/w mycophenolate   -cardiology following    #GI and DVT prophylaxis  -d/w Dr Edmondson

## 2024-10-11 NOTE — ED ADULT NURSE NOTE - NSFALLRISKINTERV_ED_ALL_ED

## 2024-10-11 NOTE — ED PROVIDER NOTE - CLINICAL SUMMARY MEDICAL DECISION MAKING FREE TEXT BOX
69 yr old f that presents with abd pain, concern for uti vs pylo vs kidney stone. labs, imaging, ua, ivf, iv antibiotics. urology consult due to obstructing stone. consider admission. Labs  were ordered and reviewed.  Imaging was ordered and reviewed by me.  Appropriate medications for patient's presenting complaints were ordered and effects were reassessed.  Patient's records (prior hospital, ED visit, and/or nursing home notes if available) were reviewed.  Additional history was obtained from EMS, family, and/or PCP (where available).  Escalation to admission/observation was considered.   Patient requires inpatient hospitalization - medicine

## 2024-10-11 NOTE — H&P ADULT - ASSESSMENT
69-year-old female PMH CAD S/p heart transplant 7 years ago, HTN,  anxiety, hypothyroidism, Hysterectomy, HLD admitted for septic ureteral stone. WBC 2.59, Tmax 103.7 F, normal Cr 1.1, CT A/P: Mild right hydroureteronephrosis secondary to an obstructing calculus at  the UVJ measuring 2 x 3 x 3 mm..    A/P:  Discussed with Dr. Edmondson.  - NPO for OR now  - IV hydration  - IV abx  - UA/urine C/S  #HTN  - Norvasc 5mg QD  #Hypothyroidism  - On synthroid 112 mcg QD  #HLD/CAD  - On lipitor 20mg HS  - GI and DVT prophylaxis  (Pt son will bring rest of medications from home later) - Will start po meds post OR

## 2024-10-11 NOTE — CHART NOTE - NSCHARTNOTEFT_GEN_A_CORE
Pt hypotensive overnight. Currently about 103 systolic. Pt on chronic steroids. Will give stress dose hydrocortisone 100mg Q8H for three days or until D/C. Pt to restart PO Prednisone after this course or upon D/C home.

## 2024-10-11 NOTE — PROGRESS NOTE ADULT - SUBJECTIVE AND OBJECTIVE BOX
HPI:  Pt feeling better, denies any abd pain or n/v.      PAST MEDICAL & SURGICAL HISTORY:  Anxiety    Coronary artery disease    Hyperlipidemia    Hypothyroid    H/O heart transplant      Allergies    No Known Allergies      MEDICATIONS  (STANDING):  aspirin  chewable 81 milliGRAM(s) Oral daily  atorvastatin 10 milliGRAM(s) Oral at bedtime  chlorhexidine 2% Cloths 1 Application(s) Topical <User Schedule>  famotidine    Tablet 20 milliGRAM(s) Oral daily  heparin   Injectable 5000 Unit(s) SubCutaneous every 12 hours  hydrocortisone sodium succinate Injectable 100 milliGRAM(s) IV Push three times a day  levothyroxine 137 MICROGram(s) Oral daily  mycophenolate mofetil 540 milliGRAM(s) Oral two times a day  piperacillin/tazobactam IVPB.. 3.375 Gram(s) IV Intermittent every 8 hours  rosuvastatin 20 milliGRAM(s) Oral at bedtime  sodium chloride 0.9%. 1000 milliLiter(s) (100 mL/Hr) IV Continuous <Continuous>  tacrolimus 1.5 milliGRAM(s) Oral two times a day      ROS:  General:	+ fever  chills  Skin: no rash, ulcers  Respiratory and Thorax: no cough, wheeze,  sob  Cardiovascular:	no chest pain, palpitations, dizziness  Gastrointestinal:	no nausea, vomiting, diarrhea, abd pain  Genitourinary:	no dysuria, hematuria  Musculoskeletal:	no joint pains  Neurological:	 no speech disturbance, focal weakness, numbness  Psychiatric:	no depression, anxiety, psychosis  Hematology/Lymphatics:	no anemia  Endocrine:	no polyuria, polydipsia        Vital Signs Last 24 Hrs  T(F): 98 (11 Oct 2024 07:13), Max: 103.7 (10 Oct 2024 23:31)  HR: 95 (11 Oct 2024 05:00) (89 - 124)  BP: 89/52 (11 Oct 2024 05:00) (64/33 - 143/67)  BP(mean): 66 (11 Oct 2024 05:00) (66 - 66)  RR: 22 (11 Oct 2024 09:01) (16 - 23)  SpO2: 96% (11 Oct 2024 09:01) (95% - 98%)    PHYSICAL EXAM:      Constitutional: A&Ox4  Respiratory: cta b/l  Cardiovascular: s1 s2 rrr  Gastrointestinal: soft nt  nd + bs no rebound or guarding  Genitourinary: no cva tenderness  Extremities: normal rom, no edema, calf tenderness  Neurological:no focal deficits  Skin: no rash

## 2024-10-11 NOTE — ED PROVIDER NOTE - PHYSICAL EXAMINATION
GENERAL: appears pale, fatigued. NAD.  HEAD: No visible or palpable bumps or hematomas. No ecchymosis behind ears B/L.  Eyes: PERRLA  CVS: Normal S1,S2. No murmurs appreciated on auscultation   RESP: No use of accessory muscles. Chest rise symmetrical with good expansion. Lungs clear to auscultation B/L. No wheezing, rales, or rhonchi auscultated.  GI: Normal auscultation of bowel sounds in all 4 quadrants.(+)diffuse ttp. Soft, Nondistended. No guarding or rebound tenderness. No CVAT B/L.  Skin: Warm, Dry. No rashes or lesions. Good cap refill < 2 sec B/L.

## 2024-10-11 NOTE — CONSULT NOTE ADULT - ASSESSMENT
A/P:     1. Kidney stone / Hypotension   - urology fu   - c/w antibiotics  - fu all cx   - c/w IV fluids  - may need pressors   - monitor I&O's    2. Cardiac transplant   - cardiology fu   - c/w anti suppressents     3. DVT ppx

## 2024-10-11 NOTE — H&P ADULT - NSHPPHYSICALEXAM_GEN_ALL_CORE
Vital Signs Last 24 Hrs  T(C): 39.8 (10 Oct 2024 23:31), Max: 39.8 (10 Oct 2024 23:31)  T(F): 103.7 (10 Oct 2024 23:31), Max: 103.7 (10 Oct 2024 23:31)  HR: 105 (11 Oct 2024 01:29) (105 - 124)  BP: 143/67 (11 Oct 2024 01:29) (83/53 - 143/67)  BP(mean): --  RR: 20 (11 Oct 2024 01:29) (20 - 20)  SpO2: 97% (11 Oct 2024 01:32) (97% - 97%)    Parameters below as of 11 Oct 2024 01:29  Patient On (Oxygen Delivery Method): room air          Constitutional: NAD, A&O x3    Eyes: PERRLA, no conjuctivitis    Neck: no lymphadenopathy    Respiratory: +air entry, no rales, no rhonchi, no wheezes    Cardiovascular: +S1 and S2, regular rate and rhythm    Gastrointestinal: +BS, soft,  (+) right CVAT, not distended.     Extremities:  no edema, no calf tenderness    Vascular: +dorsal pedis and radial pulses, no extremity cyanosis    Neurological: sensation intact, ROM equal B/L, CN II-XII intact    Skin: no rashes, normal turgor

## 2024-10-11 NOTE — BRIEF OPERATIVE NOTE - NSICDXBRIEFPREOP_GEN_ALL_CORE_FT
PRE-OP DIAGNOSIS:  Sepsis 11-Oct-2024 02:40:27  Mann Edmondson  Right ureteral stone 11-Oct-2024 02:40:34  Mann Edmondson

## 2024-10-11 NOTE — ED PROVIDER NOTE - OBJECTIVE STATEMENT
69-year-old female PMH CAD, anxiety, hypothyroidism, HLD presenting to ED for evaluation of generalized weakness, fever, chills, nausea, vomiting, abdominal pain over the past 2 days.  Son reporting patient has been increasingly weak.  Denies chest pain, shortness of breath.

## 2024-10-11 NOTE — H&P ADULT - HISTORY OF PRESENT ILLNESS
69-year-old female PMH CAD S/p heart transplant 7 years ago, HTN,  anxiety, hypothyroidism, Hysterectomy, HLD presenting to ED for evaluation of generalized weakness, fever, chills, nausea, vomiting, abdominal pain over the past 2 days.  Son reporting patient has been increasingly weak.  Denies chest pain, shortness of breath and hematuria.  Pt admits to dysuria.

## 2024-10-11 NOTE — ED PROVIDER NOTE - ATTENDING APP SHARED VISIT CONTRIBUTION OF CARE
69 yr old f w/ a pmh significant for htn, hld, dm, s/p cardiac transplant who presents with R sided abd pain. Pt states that since yesterday she has been having nausea, however, has not had any vomiting. Pt also reports R sided abd pain, with urinary discomfort. Pt denies any blood in the urine or stool. Pt denies any other medical complaints.     VITAL SIGNS: I have reviewed nursing notes and confirm.  CONSTITUTIONAL: non-toxic, well appearing  SKIN: no rash, no petechiae.  EYES: EOMI, pink conjunctiva, anicteric  ENT: tongue midline, no exudates, MMM  NECK: Supple; no meningismus, no JVD  CARD: RRR, no murmurs, equal radial pulses bilaterally 2+  RESP: CTAB, no respiratory distress  ABD: Soft, R sided abd tenderness, non-distended, no peritoneal signs, no HSM, no CVA tenderness      69 yr old f that presents with abd pain, concern for uti vs pylo vs kidney stone. labs, imaging, ua, ivf, iv antibiotics. urology consult due to obstructing stone. consider admission.

## 2024-10-11 NOTE — ED PROVIDER NOTE - CARE PLAN
1 Principal Discharge DX:	Sepsis  Secondary Diagnosis:	Urinary tract obstruction due to kidney stone

## 2024-10-11 NOTE — BRIEF OPERATIVE NOTE - NSICDXBRIEFPOSTOP_GEN_ALL_CORE_FT
POST-OP DIAGNOSIS:  Sepsis 11-Oct-2024 02:40:44  Mann Edmondson  Right ureteral stone 11-Oct-2024 02:40:53  Mann Edmondson

## 2024-10-11 NOTE — CONSULT NOTE ADULT - ASSESSMENT
69-year-old female PMH CAD S/p heart transplant 7 years ago, HTN,  anxiety, hypothyroidism, Hysterectomy, HLD presenting to ED for evaluation of generalized weakness, fever, chills, nausea, vomiting, abdominal pain over the past 2 days. She had a kidney stone. She got a urethral  stent. Culture rx infection. Continue cardiac meds. Repeat WBC. F/U [place] transplant

## 2024-10-11 NOTE — H&P ADULT - NSHPLABSRESULTS_GEN_ALL_CORE
14.4   2.59  )-----------( 158      ( 10 Oct 2024 23:49 )             45.1     10-10    139  |  103  |  19  ----------------------------<  168[H]  4.3   |  22  |  1.1    Ca    8.8      10 Oct 2024 23:49    TPro  6.2  /  Alb  3.8  /  TBili  1.3[H]  /  DBili  x   /  AST  25  /  ALT  19  /  AlkPhos  105  10-10          Urinalysis Basic - ( 10 Oct 2024 23:49 )    Color: x / Appearance: x / SG: x / pH: x  Gluc: 168 mg/dL / Ketone: x  / Bili: x / Urobili: x   Blood: x / Protein: x / Nitrite: x   Leuk Esterase: x / RBC: x / WBC x   Sq Epi: x / Non Sq Epi: x / Bacteria: x            < from: CT Abdomen and Pelvis w/ IV Cont (10.11.24 @ 00:43) >    IMPRESSION:    Mild right hydroureteronephrosis secondary to an obstructing calculus at   the UVJ measuring 2 x 3 x 3 mm..    < end of copied text >

## 2024-10-11 NOTE — H&P ADULT - TIME BILLING
Patient in septic shock  distal right ureteral stone -   fever to 103, UVJ stone  - emergent stent placement  - ICU consult

## 2024-10-12 LAB
ALBUMIN SERPL ELPH-MCNC: 2.5 G/DL — LOW (ref 3.5–5.2)
ALP SERPL-CCNC: 55 U/L — SIGNIFICANT CHANGE UP (ref 30–115)
ALT FLD-CCNC: 17 U/L — SIGNIFICANT CHANGE UP (ref 0–41)
ANION GAP SERPL CALC-SCNC: 7 MMOL/L — SIGNIFICANT CHANGE UP (ref 7–14)
AST SERPL-CCNC: 17 U/L — SIGNIFICANT CHANGE UP (ref 0–41)
BILIRUB SERPL-MCNC: 0.3 MG/DL — SIGNIFICANT CHANGE UP (ref 0.2–1.2)
BUN SERPL-MCNC: 17 MG/DL — SIGNIFICANT CHANGE UP (ref 10–20)
CALCIUM SERPL-MCNC: 7 MG/DL — LOW (ref 8.4–10.5)
CHLORIDE SERPL-SCNC: 112 MMOL/L — HIGH (ref 98–110)
CO2 SERPL-SCNC: 25 MMOL/L — SIGNIFICANT CHANGE UP (ref 17–32)
CREAT SERPL-MCNC: 0.9 MG/DL — SIGNIFICANT CHANGE UP (ref 0.7–1.5)
EGFR: 70 ML/MIN/1.73M2 — SIGNIFICANT CHANGE UP
GLUCOSE SERPL-MCNC: 146 MG/DL — HIGH (ref 70–99)
HCT VFR BLD CALC: 34.4 % — LOW (ref 37–47)
HCT VFR BLD CALC: 40.4 % — SIGNIFICANT CHANGE UP (ref 37–47)
HGB BLD-MCNC: 11 G/DL — LOW (ref 12–16)
HGB BLD-MCNC: 12.8 G/DL — SIGNIFICANT CHANGE UP (ref 12–16)
LACTATE SERPL-SCNC: 1 MMOL/L — SIGNIFICANT CHANGE UP (ref 0.7–2)
MAGNESIUM SERPL-MCNC: 1.6 MG/DL — LOW (ref 1.8–2.4)
MCHC RBC-ENTMCNC: 30 PG — SIGNIFICANT CHANGE UP (ref 27–31)
MCHC RBC-ENTMCNC: 30 PG — SIGNIFICANT CHANGE UP (ref 27–31)
MCHC RBC-ENTMCNC: 31.7 G/DL — LOW (ref 32–37)
MCHC RBC-ENTMCNC: 32 G/DL — SIGNIFICANT CHANGE UP (ref 32–37)
MCV RBC AUTO: 93.7 FL — SIGNIFICANT CHANGE UP (ref 81–99)
MCV RBC AUTO: 94.6 FL — SIGNIFICANT CHANGE UP (ref 81–99)
NRBC # BLD: 0 /100 WBCS — SIGNIFICANT CHANGE UP (ref 0–0)
NRBC # BLD: 0 /100 WBCS — SIGNIFICANT CHANGE UP (ref 0–0)
PHOSPHATE SERPL-MCNC: 2.7 MG/DL — SIGNIFICANT CHANGE UP (ref 2.1–4.9)
PLATELET # BLD AUTO: 133 K/UL — SIGNIFICANT CHANGE UP (ref 130–400)
PLATELET # BLD AUTO: 153 K/UL — SIGNIFICANT CHANGE UP (ref 130–400)
PMV BLD: 10.7 FL — HIGH (ref 7.4–10.4)
PMV BLD: 11.5 FL — HIGH (ref 7.4–10.4)
POTASSIUM SERPL-MCNC: 4.1 MMOL/L — SIGNIFICANT CHANGE UP (ref 3.5–5)
POTASSIUM SERPL-SCNC: 4.1 MMOL/L — SIGNIFICANT CHANGE UP (ref 3.5–5)
PROT SERPL-MCNC: 4.4 G/DL — LOW (ref 6–8)
RBC # BLD: 3.67 M/UL — LOW (ref 4.2–5.4)
RBC # BLD: 4.27 M/UL — SIGNIFICANT CHANGE UP (ref 4.2–5.4)
RBC # FLD: 13.6 % — SIGNIFICANT CHANGE UP (ref 11.5–14.5)
RBC # FLD: 13.6 % — SIGNIFICANT CHANGE UP (ref 11.5–14.5)
SODIUM SERPL-SCNC: 144 MMOL/L — SIGNIFICANT CHANGE UP (ref 135–146)
TACROLIMUS SERPL-MCNC: 5.5 NG/ML — SIGNIFICANT CHANGE UP
WBC # BLD: 20.35 K/UL — HIGH (ref 4.8–10.8)
WBC # BLD: 24.84 K/UL — HIGH (ref 4.8–10.8)
WBC # FLD AUTO: 20.35 K/UL — HIGH (ref 4.8–10.8)
WBC # FLD AUTO: 24.84 K/UL — HIGH (ref 4.8–10.8)

## 2024-10-12 PROCEDURE — 99232 SBSQ HOSP IP/OBS MODERATE 35: CPT

## 2024-10-12 PROCEDURE — 99233 SBSQ HOSP IP/OBS HIGH 50: CPT

## 2024-10-12 RX ORDER — MAGNESIUM SULFATE 500 MG/ML
2 VIAL (ML) INJECTION
Refills: 0 | Status: COMPLETED | OUTPATIENT
Start: 2024-10-12 | End: 2024-10-12

## 2024-10-12 RX ADMIN — MYCOPHENOLIC ACID 540 MILLIGRAM(S): 180 TABLET, DELAYED RELEASE ORAL at 16:31

## 2024-10-12 RX ADMIN — MYCOPHENOLIC ACID 540 MILLIGRAM(S): 180 TABLET, DELAYED RELEASE ORAL at 06:02

## 2024-10-12 RX ADMIN — Medication 5000 UNIT(S): at 17:16

## 2024-10-12 RX ADMIN — HYDROCORTISONE 100 MILLIGRAM(S): 5 TABLET ORAL at 13:43

## 2024-10-12 RX ADMIN — HYDROCORTISONE 100 MILLIGRAM(S): 5 TABLET ORAL at 06:00

## 2024-10-12 RX ADMIN — TACROLIMUS 1.5 MILLIGRAM(S): 4 TABLET, EXTENDED RELEASE ORAL at 06:00

## 2024-10-12 RX ADMIN — TACROLIMUS 1.5 MILLIGRAM(S): 4 TABLET, EXTENDED RELEASE ORAL at 16:42

## 2024-10-12 RX ADMIN — CHLORHEXIDINE GLUCONATE ORAL RINSE 1 APPLICATION(S): 1.2 SOLUTION DENTAL at 06:00

## 2024-10-12 RX ADMIN — Medication 5000 UNIT(S): at 06:00

## 2024-10-12 RX ADMIN — Medication 25 GRAM(S): at 14:15

## 2024-10-12 RX ADMIN — PIPERACILLIN SODIUM AND TAZOBACTAM SODIUM 25 GRAM(S): 12; 1.5 INJECTION, POWDER, LYOPHILIZED, FOR SOLUTION INTRAVENOUS at 13:44

## 2024-10-12 RX ADMIN — Medication 137 MICROGRAM(S): at 05:59

## 2024-10-12 RX ADMIN — Medication 100 MILLILITER(S): at 06:01

## 2024-10-12 RX ADMIN — ROSUVASTATIN CALCIUM 20 MILLIGRAM(S): 20 TABLET, COATED ORAL at 21:25

## 2024-10-12 RX ADMIN — PIPERACILLIN SODIUM AND TAZOBACTAM SODIUM 25 GRAM(S): 12; 1.5 INJECTION, POWDER, LYOPHILIZED, FOR SOLUTION INTRAVENOUS at 21:25

## 2024-10-12 RX ADMIN — PIPERACILLIN SODIUM AND TAZOBACTAM SODIUM 25 GRAM(S): 12; 1.5 INJECTION, POWDER, LYOPHILIZED, FOR SOLUTION INTRAVENOUS at 05:59

## 2024-10-12 RX ADMIN — Medication 20 MILLIGRAM(S): at 12:11

## 2024-10-12 RX ADMIN — Medication 25 GRAM(S): at 16:42

## 2024-10-12 RX ADMIN — HYDROCORTISONE 100 MILLIGRAM(S): 5 TABLET ORAL at 21:25

## 2024-10-12 RX ADMIN — Medication 81 MILLIGRAM(S): at 12:11

## 2024-10-12 NOTE — PROGRESS NOTE ADULT - ASSESSMENT
69-year-old female PMH CAD S/p heart transplant 7 years ago, HTN,  anxiety, hypothyroidism, Hysterectomy, HLD admitted for septic ureteral stone. WBC 2.59, Tmax 103.7 F, normal Cr 1.1, CT A/P: Mild right hydroureteronephrosis secondary to an obstructing calculus at  the UVJ measuring 2 x 3 x 3 mm..    #Mild right hydroureteronephrosis secondary to an obstructing calculus at  the UVJ measuring  # s/p Cystoscopic insertion of ureteral stent this morning  - dc IVF  - IV abx  - UA/urine C/S  - follow cultures   - ID eval   - urology following     H/O  heart transplant   appreciate cardio recs     #HTN  - Norvasc 5mg QD    #Hypothyroidism  - On synthroid 112 mcg QD    #HLD/CAD  - On lipitor 20mg HS  - DVT prophylaxis    #Progress Note Handoff  Pending (specify):  repeat cbc, c/w home meds,

## 2024-10-12 NOTE — PROGRESS NOTE ADULT - SUBJECTIVE AND OBJECTIVE BOX
S; Pt doing well, just c/o vaginal pressure-like sensation when washing up this AM. Otherwise denies F/C/N/V/flank pain. Afebrile. Guevara in place with yellow urine  O; Vital Signs Last 24 Hrs  T(C): 35.8 (12 Oct 2024 07:00), Max: 37.1 (11 Oct 2024 17:01)  T(F): 96.5 (12 Oct 2024 07:00), Max: 98.7 (11 Oct 2024 17:01)  HR: 85 (12 Oct 2024 10:00) (67 - 94)  BP: 90/54 (12 Oct 2024 10:00) (87/49 - 105/50)  BP(mean): 66 (12 Oct 2024 10:00) (61 - 77)  RR: 28 (12 Oct 2024 10:00) (18 - 33)  SpO2: 94% (12 Oct 2024 10:00) (94% - 98%)    Parameters below as of 12 Oct 2024 10:00  Patient On (Oxygen Delivery Method): room air    I&O's Detail    11 Oct 2024 07:01  -  12 Oct 2024 07:00  --------------------------------------------------------  IN:    IV PiggyBack: 400 mL    Oral Fluid: 540 mL    sodium chloride 0.9%: 2400 mL  Total IN: 3340 mL    OUT:    Indwelling Catheter - Urethral (mL): 2150 mL  Total OUT: 2150 mL    Total NET: 1190 mL      12 Oct 2024 07:01  -  12 Oct 2024 13:30  --------------------------------------------------------  IN:    Oral Fluid: 240 mL    sodium chloride 0.9%: 500 mL  Total IN: 740 mL    OUT:    Indwelling Catheter - Urethral (mL): 195 mL  Total OUT: 195 mL    Total NET: 545 mL    MEDICATIONS  (STANDING):  aspirin  chewable 81 milliGRAM(s) Oral daily  atorvastatin 10 milliGRAM(s) Oral at bedtime  chlorhexidine 2% Cloths 1 Application(s) Topical <User Schedule>  famotidine    Tablet 20 milliGRAM(s) Oral daily  heparin   Injectable 5000 Unit(s) SubCutaneous every 12 hours  hydrocortisone sodium succinate Injectable 100 milliGRAM(s) IV Push three times a day  levothyroxine 137 MICROGram(s) Oral daily  mycophenolic acid  milliGRAM(s) Oral two times a day  piperacillin/tazobactam IVPB.. 3.375 Gram(s) IV Intermittent every 8 hours  rosuvastatin 20 milliGRAM(s) Oral at bedtime  tacrolimus 1.5 milliGRAM(s) Oral two times a day      EXAM:  GEN: well appearing  ; guevara with yellow urine    Labs:                          12.8   24.84 )-----------( 153      ( 12 Oct 2024 09:00 )             40.4         10-12    144  |  112[H]  |  17  ----------------------------<  146[H]  4.1   |  25  |  0.9      Magnesium: 1.6 mg/dL (10-12-24 @ 06:16)      LFTs:             4.4  | 0.3  | 17       ------------------[55      ( 12 Oct 2024 06:16 )  2.5  | x    | 17          Lipase:x      Amylase:x         Lactate, Blood: 1.0 mmol/L (10-12-24 @ 06:16)  Lactate, Blood: 2.2 mmol/L (10-11-24 @ 02:10)  Blood Gas Venous - Lactate: 3.3 mmol/L (10-10-24 @ 23:55)  Lactate, Blood: 3.1 mmol/L (10-10-24 @ 23:49)      Coags:     14.20  ----< 1.24    ( 11 Oct 2024 01:46 )     26.4            Urinalysis Basic - ( 12 Oct 2024 06:16 )    Color: x / Appearance: x / SG: x / pH: x  Gluc: 146 mg/dL / Ketone: x  / Bili: x / Urobili: x   Blood: x / Protein: x / Nitrite: x   Leuk Esterase: x / RBC: x / WBC x   Sq Epi: x / Non Sq Epi: x / Bacteria: x        Culture - Blood (collected 10 Oct 2024 23:49)  Source: .Blood BLOOD  Gram Stain (11 Oct 2024 19:33):    Growth in anaerobic bottle: Gram Negative Rods    Growth in aerobic bottle: Gram Negative Rods  Preliminary Report (11 Oct 2024 19:34):    Growth in anaerobic bottle: Gram Negative Rods    Growth in aerobic bottle: Gram Negative Rods    Direct identification is available within approximately 3-5    hours either by Blood Panel Multiplexed PCR or Direct    MALDI-TOF. Details: [email or web address]  Organism: Blood Culture PCR (11 Oct 2024 20:45)  Organism: Blood Culture PCR (11 Oct 2024 20:45)    Culture - Blood (collected 10 Oct 2024 23:49)  Source: .Blood BLOOD  Gram Stain (11 Oct 2024 19:32):    Growth in aerobic and anaerobic bottles: Gram Negative Rods  Preliminary Report (11 Oct 2024 19:32):    Growth in aerobic and anaerobic bottles: Gram Negative Rods

## 2024-10-12 NOTE — PATIENT PROFILE ADULT - FALL HARM RISK - TYPE OF ASSESSMENT
Admission
Patient with apraxia of speech characterized by inconsistent phonemic errors with no specific pattern./impaired

## 2024-10-12 NOTE — PROGRESS NOTE ADULT - ASSESSMENT
A/P:   kidney stone hydronephrosis s/p ureteral stent   bacteremia   UTI   cardiac transplant       PLAN:    CNS: no sedation     HEENT: oral care     PULMONARY: keep pox >92%     CARDIOVASCULAR: continue home medication   follow cardiology recommendation   can d/c iv fluid     GI: GI prophylaxis.  Feeding     RENAL:follow morning bun, cr   follow Urology   INFECTIOUS DISEASE: on zosyn follow cx   ID eval     HEMATOLOGICAL:  DVT prophylaxis.    ENDOCRINE:  Follow up FS.  Insulin protocol if needed.    from pulmonary can downgrade to floor if ok by cardiology recall prn         CRITICAL CARE TIME SPENT: ***

## 2024-10-12 NOTE — CHART NOTE - NSCHARTNOTEFT_GEN_A_CORE
69-year-old female PMH CAD S/p heart transplant 7 years ago, HTN, anxiety, hypothyroidism, Hysterectomy, HLD admitted for septic ureteral stone. WBC 2.59, Tmax 103.7 F, normal Cr 1.1, CT A/P: Mild right hydroureteronephrosis secondary to an obstructing calculus at  the UVJ measuring 2 x 3 x 3 mm.    During her stay, urology performed Cystoscopic insertion of ureteral stent to relieve the obstruction.  Patient was started on zosyn for UTI  Also found to have Gram Negative Rods in blood.     #Mild right hydroureteronephrosis secondary to an obstructing calculus at the UVJ measuring  # s/p Cystoscopic insertion of ureteral stent  #bacteremia Gram Negative Rods   - Zosyn   - UA positive  - follow up blood and urine cultures   - f up ID eval   - urology following : on 10/12 -> may D/C guevara from  perspective    #H/O  heart transplant   appreciate cardio recs     #HTN  - Norvasc 5mg QD    #Hypothyroidism  - On synthroid 112 mcg QD    #HLD/CAD  - On lipitor 20mg HS  - DVT prophylaxis      pending: guevara removed on 10/12 around 2 pm -> f up TOV

## 2024-10-12 NOTE — PROGRESS NOTE ADULT - ASSESSMENT
69-year-old female PMH CAD S/p heart transplant 7 years ago, HTN,  anxiety, hypothyroidism, Hysterectomy, HLD admitted for septic ureteral stone. WBC 2.59, Tmax 103.7 F, normal Cr 1.1, CT A/P: Mild right hydroureteronephrosis secondary to an obstructing calculus at  the UVJ measuring 2 x 3 x 3 mm..    #sepsis secondary to UTI  #Mild right hydroureteronephrosis secondary to an obstructing calculus at  the UVJ measuring  # s/p right ureteral stent/guevara 10/10  #Gram neg bacteremia    - no further  intervention at this time  -may D/C guevara from  perspective  -pt will need outpatient definitive stone treatement with Dr. Edmondson   - cont IV abx; f/u final cultures  -D/W Dr. Edmondson      #HTN  #Hypothyroidism  #HLD/CAD  #s/p heart transplant   - remainder of care as per ICU team

## 2024-10-12 NOTE — MEDICAL STUDENT PROGRESS NOTE(EDUCATION) - SUBJECTIVE AND OBJECTIVE BOX
Subjective:  HPI:  The patient is a 69-year-old female, w/ PMHx of CAD s/p heart transplant 7 years ago, HTN, anxiety, hypothyroidism, hysterectomy, HLD, initially presented to ED for evaluation of generalized weakness, fevers, chills, N/V, abdominal pain. Patient was admitted for septic uretal stone. Currently denies fevers, chills, n/v, hematuria, dysuria. Mild abd tenderness but denies flank pain. She reports mild pressure feeling at the site of guevara this morning. Guevara catheter in with yellow urine.     PAST MEDICAL & SURGICAL HISTORY:  Anxiety  Coronary artery disease  Hyperlipidemia  Hypothyroid  h/o heart transplant    ROS  General: No fever, no chills, no weight loss  Respiratory and Thorax: No cough, wheeze, sob  Cardiovascular:	No chest pain, palpitations, dizziness  Gastrointestinal:	No nausea, vomiting, diarrhea, abd pain  Genitourinary: No dysuria, hematuria, guevara catheter in place with yellow urine  Musculoskeletal:	 No joint pains  Endocrine: No polyuria, polydipsia    MEDICATIONS  (STANDING):  aspirin  chewable 81 milliGRAM(s) Oral daily  atorvastatin 10 milliGRAM(s) Oral at bedtime  chlorhexidine 2% Cloths 1 Application(s) Topical <User Schedule>  famotidine    Tablet 20 milliGRAM(s) Oral daily  heparin   Injectable 5000 Unit(s) SubCutaneous every 12 hours  hydrocortisone sodium succinate Injectable 100 milliGRAM(s) IV Push three times a day  levothyroxine 137 MICROGram(s) Oral daily  magnesium sulfate  IVPB 2 Gram(s) IV Intermittent every 2 hours  mycophenolic acid  milliGRAM(s) Oral two times a day  piperacillin/tazobactam IVPB.. 3.375 Gram(s) IV Intermittent every 8 hours  rosuvastatin 20 milliGRAM(s) Oral at bedtime  tacrolimus 1.5 milliGRAM(s) Oral two times a day    Allergies  No Known Allergies    SOCIAL HISTORY: No illicit drug use    FAMILY HISTORY: non-contributory     Objective  Vital Signs Last 24 Hrs  T(C): 35.8 (12 Oct 2024 07:00), Max: 37.1 (11 Oct 2024 17:01)  T(F): 96.5 (12 Oct 2024 07:00), Max: 98.7 (11 Oct 2024 17:01)  HR: 85 (12 Oct 2024 10:00) (67 - 94)  BP: 90/54 (12 Oct 2024 10:00) (87/49 - 105/50)  BP(mean): 66 (12 Oct 2024 10:00) (61 - 77)  RR: 28 (12 Oct 2024 10:00) (18 - 33)  SpO2: 94% (12 Oct 2024 10:00) (94% - 98%)    Parameters below as of 12 Oct 2024 10:00  Patient On (Oxygen Delivery Method): room air    Physical exam  GENERAL: NAD, laying comfortably in bed   HEAD: Atraumatic, Normocephalic  EYES: Conjunctiva and sclera clear  NERVOUS SYSTEM: Alert & Oriented x3  CHEST/LUNG: CTAB, no wheezing, rhonchi, or rales, no accessory muscle use    HEART: RRR, normal S1 and S2, no m/r/g  ABDOMEN: Soft, non-distended, mildly tender upon palpation in RLQ  : Guevara catheter in place  EXTREMITIES: No clubbing, cyanosis, or edema    I&O's Summary    11 Oct 2024 07:01  -  12 Oct 2024 07:00  --------------------------------------------------------  IN: 3340 mL / OUT: 2150 mL / NET: 1190 mL    12 Oct 2024 07:01  -  12 Oct 2024 13:51  --------------------------------------------------------  IN: 740 mL / OUT: 195 mL / NET: 545 mL        LABS:                12.8   24.84 )-----------( 153      ( 12 Oct 2024 09:00 )             40.4     10-12    144  |  112[H]  |  17  ----------------------------<  146[H]  4.1   |  25  |  0.9    Ca    7.0[L]      12 Oct 2024 06:16  Phos  2.7     10-12  Mg     1.6     10-12    TPro  4.4[L]  /  Alb  2.5[L]  /  TBili  0.3  /  DBili  x   /  AST  17  /  ALT  17  /  AlkPhos  55  10-12    PT/INR - ( 11 Oct 2024 01:46 )   PT: 14.20 sec;   INR: 1.24 ratio         PTT - ( 11 Oct 2024 01:46 )  PTT:26.4 sec  Urinalysis Basic - ( 12 Oct 2024 06:16 )    Color: x / Appearance: x / SG: x / pH: x  Gluc: 146 mg/dL / Ketone: x  / Bili: x / Urobili: x   Blood: x / Protein: x / Nitrite: x   Leuk Esterase: x / RBC: x / WBC x   Sq Epi: x / Non Sq Epi: x / Bacteria: x

## 2024-10-12 NOTE — PROGRESS NOTE ADULT - SUBJECTIVE AND OBJECTIVE BOX
REGIS GUZMAN 69y Female  MRN#: [id number]     SUBJECTIVE  Patient is a 69y old Female who presents with a chief complaint of Kidney stone (11 Oct 2024 06:55)  Currently admitted to medicine with the primary diagnosis of UTI  no overnight events     OBJECTIVE  PAST MEDICAL & SURGICAL HISTORY  Anxiety    Coronary artery disease    Hyperlipidemia    Hypothyroid    H/O heart transplant      ALLERGIES:  No Known Allergies    MEDICATIONS:  STANDING MEDICATIONS  aspirin  chewable 81 milliGRAM(s) Oral daily  atorvastatin 10 milliGRAM(s) Oral at bedtime  chlorhexidine 2% Cloths 1 Application(s) Topical <User Schedule>  famotidine    Tablet 20 milliGRAM(s) Oral daily  heparin   Injectable 5000 Unit(s) SubCutaneous every 12 hours  hydrocortisone sodium succinate Injectable 100 milliGRAM(s) IV Push three times a day  levothyroxine 137 MICROGram(s) Oral daily  mycophenolic acid  milliGRAM(s) Oral two times a day  piperacillin/tazobactam IVPB.. 3.375 Gram(s) IV Intermittent every 8 hours  rosuvastatin 20 milliGRAM(s) Oral at bedtime  sodium chloride 0.9%. 1000 milliLiter(s) IV Continuous <Continuous>  tacrolimus 1.5 milliGRAM(s) Oral two times a day    PRN MEDICATIONS      VITAL SIGNS: Last 24 Hours  T(C): 35.8 (12 Oct 2024 07:00), Max: 37.1 (11 Oct 2024 17:01)  T(F): 96.5 (12 Oct 2024 07:00), Max: 98.7 (11 Oct 2024 17:01)  HR: 85 (12 Oct 2024 10:00) (67 - 94)  BP: 90/54 (12 Oct 2024 10:00) (87/49 - 105/50)  BP(mean): 66 (12 Oct 2024 10:00) (61 - 77)  RR: 28 (12 Oct 2024 10:00) (18 - 33)  SpO2: 94% (12 Oct 2024 10:00) (94% - 98%)    LABS:                        12.8   24.84 )-----------( 153      ( 12 Oct 2024 09:00 )             40.4     10-12    144  |  112[H]  |  17  ----------------------------<  146[H]  4.1   |  25  |  0.9    Ca    7.0[L]      12 Oct 2024 06:16  Phos  2.7     10-12  Mg     1.6     10-12    TPro  4.4[L]  /  Alb  2.5[L]  /  TBili  0.3  /  DBili  x   /  AST  17  /  ALT  17  /  AlkPhos  55  10-12    PT/INR - ( 11 Oct 2024 01:46 )   PT: 14.20 sec;   INR: 1.24 ratio         PTT - ( 11 Oct 2024 01:46 )  PTT:26.4 sec  Urinalysis Basic - ( 12 Oct 2024 06:16 )    Color: x / Appearance: x / SG: x / pH: x  Gluc: 146 mg/dL / Ketone: x  / Bili: x / Urobili: x   Blood: x / Protein: x / Nitrite: x   Leuk Esterase: x / RBC: x / WBC x   Sq Epi: x / Non Sq Epi: x / Bacteria: x        Lactate, Blood: 1.0 mmol/L (10-12-24 @ 06:16)      Culture - Blood (collected 10 Oct 2024 23:49)  Source: .Blood BLOOD  Gram Stain (11 Oct 2024 19:33):    Growth in anaerobic bottle: Gram Negative Rods    Growth in aerobic bottle: Gram Negative Rods  Preliminary Report (11 Oct 2024 19:34):    Growth in anaerobic bottle: Gram Negative Rods    Growth in aerobic bottle: Gram Negative Rods    Direct identification is available within approximately 3-5    hours either by Blood Panel Multiplexed PCR or Direct    MALDI-TOF. Details: [email or web address]  Organism: Blood Culture PCR (11 Oct 2024 20:45)  Organism: Blood Culture PCR (11 Oct 2024 20:45)    Culture - Blood (collected 10 Oct 2024 23:49)  Source: .Blood BLOOD  Gram Stain (11 Oct 2024 19:32):    Growth in aerobic and anaerobic bottles: Gram Negative Rods  Preliminary Report (11 Oct 2024 19:32):    Growth in aerobic and anaerobic bottles: Gram Negative Rods      PHYSICAL EXAM:    GENERAL:  AAOx3  HEENT:  No JVD  PULMONARY: Clear to auscultation bilaterally;  CARDIOVASCULAR: Regular rate and rhythm  GASTROINTESTINAL: Soft, Nontender, Nondistended  MUSCULOSKELETAL: No clubbing, cyanosis, or edema  NEUROLOGY: non-focal  SKIN: No rashes or lesions

## 2024-10-12 NOTE — PROGRESS NOTE ADULT - SUBJECTIVE AND OBJECTIVE BOX
Patient is a 69y old  Female who presents with a chief complaint of Kidney stone (11 Oct 2024 06:55)      Over Night Events:  Patient seen and examined.   no distress feel bnetter   on IV fluid   chart reviewed     ROS:  See HPI    PHYSICAL EXAM    ICU Vital Signs Last 24 Hrs  T(C): 35.8 (12 Oct 2024 03:00), Max: 37.1 (11 Oct 2024 17:01)  T(F): 96.5 (12 Oct 2024 03:00), Max: 98.7 (11 Oct 2024 17:01)  HR: 74 (12 Oct 2024 06:00) (67 - 90)  BP: 95/53 (12 Oct 2024 06:00) (87/49 - 105/50)  BP(mean): 64 (12 Oct 2024 06:00) (61 - 74)  ABP: --  ABP(mean): --  RR: 21 (12 Oct 2024 06:00) (18 - 33)  SpO2: 98% (12 Oct 2024 06:00) (95% - 98%)    O2 Parameters below as of 11 Oct 2024 23:00  Patient On (Oxygen Delivery Method): nasal cannula  O2 Flow (L/min): 2          General:awake   HEENT:    trevon            Lymph Nodes: NO cervical LN   Lungs: Bilateral BS  Cardiovascular: Regular   Abdomen: Soft, Positive BS  Extremities: No clubbing   Skin: warm   Neurological: no focal   Musculoskeletal: move all ext     I&O's Detail    10 Oct 2024 07:01  -  11 Oct 2024 07:00  --------------------------------------------------------  IN:  Total IN: 0 mL    OUT:    Indwelling Catheter - Urethral (mL): 550 mL  Total OUT: 550 mL    Total NET: -550 mL      11 Oct 2024 07:01  -  12 Oct 2024 06:57  --------------------------------------------------------  IN:    IV PiggyBack: 400 mL    Oral Fluid: 540 mL    sodium chloride 0.9%: 2400 mL  Total IN: 3340 mL    OUT:    Indwelling Catheter - Urethral (mL): 2100 mL  Total OUT: 2100 mL    Total NET: 1240 mL          LABS:                          14.4   2.59  )-----------( 158      ( 10 Oct 2024 23:49 )             45.1         10 Oct 2024 23:49    139    |  103    |  19     ----------------------------<  168    4.3     |  22     |  1.1      Ca    8.8        10 Oct 2024 23:49                                               PT/INR - ( 11 Oct 2024 01:46 )   PT: 14.20 sec;   INR: 1.24 ratio         PTT - ( 11 Oct 2024 01:46 )  PTT:26.4 sec                                       Urinalysis Basic - ( 11 Oct 2024 01:44 )    Color: Yellow / Appearance: Cloudy / SG: >1.030 / pH: x  Gluc: x / Ketone: Negative mg/dL  / Bili: Negative / Urobili: 0.2 mg/dL   Blood: x / Protein: 30 mg/dL / Nitrite: Positive   Leuk Esterase: Moderate / RBC: >50 /HPF / WBC 29 /HPF   Sq Epi: x / Non Sq Epi: 3-5 /HPF / Bacteria: Moderate /HPF        Lactate, Blood: 2.2 mmol/L (10-11-24 @ 02:10)  Lactate, Blood: 3.1 mmol/L (10-10-24 @ 23:49)                                                          Culture - Blood (collected 10 Oct 2024 23:49)  Source: .Blood BLOOD  Gram Stain (11 Oct 2024 19:33):    Growth in anaerobic bottle: Gram Negative Rods    Growth in aerobic bottle: Gram Negative Rods  Preliminary Report (11 Oct 2024 19:34):    Growth in anaerobic bottle: Gram Negative Rods    Growth in aerobic bottle: Gram Negative Rods    Direct identification is available within approximately 3-5    hours either by Blood Panel Multiplexed PCR or Direct    MALDI-TOF. Details: [email or web address]  Organism: Blood Culture PCR (11 Oct 2024 20:45)  Organism: Blood Culture PCR (11 Oct 2024 20:45)    Culture - Blood (collected 10 Oct 2024 23:49)  Source: .Blood BLOOD  Gram Stain (11 Oct 2024 19:32):    Growth in aerobic and anaerobic bottles: Gram Negative Rods  Preliminary Report (11 Oct 2024 19:32):    Growth in aerobic and anaerobic bottles: Gram Negative Rods                                                                                           MEDICATIONS  (STANDING):  aspirin  chewable 81 milliGRAM(s) Oral daily  atorvastatin 10 milliGRAM(s) Oral at bedtime  chlorhexidine 2% Cloths 1 Application(s) Topical <User Schedule>  famotidine    Tablet 20 milliGRAM(s) Oral daily  heparin   Injectable 5000 Unit(s) SubCutaneous every 12 hours  hydrocortisone sodium succinate Injectable 100 milliGRAM(s) IV Push three times a day  levothyroxine 137 MICROGram(s) Oral daily  mycophenolic acid  milliGRAM(s) Oral two times a day  piperacillin/tazobactam IVPB.. 3.375 Gram(s) IV Intermittent every 8 hours  rosuvastatin 20 milliGRAM(s) Oral at bedtime  sodium chloride 0.9%. 1000 milliLiter(s) (100 mL/Hr) IV Continuous <Continuous>  tacrolimus 1.5 milliGRAM(s) Oral two times a day    MEDICATIONS  (PRN):          Xrays:  TLC:  OG:  ET tube:                                                                                       ECHO:  CAM ICU:

## 2024-10-12 NOTE — MEDICAL STUDENT PROGRESS NOTE(EDUCATION) - ASSESSMENT
Assessment:  The patient is a 69-year-old female, w/ PMHx of CAD s/p heart transplant 7 years ago, HTN, anxiety, hypothyroidism, hysterectomy, HLD, initially presented to ED for evaluation of generalized weakness, fevers, chills, N/V, abdominal pain. Patient was admitted for septic uretal stone. Currently denies fevers, chills, n/v. Mild abd tenderness but denies flank pain. She reports mild pressure feeling at the site of guevara this morning. Guevara catheter in with yellow urine.     Plan:  #sepsis secondary to UTI  #Mild right hydroureteronephrosis secondary to an obstructing calculus at the UVJ   #s/p right ureteral stent/guevara 10/10  #Gram neg bacteremia    - c/w IV abx, f/u w/ final cx  - No further  intervention at this time  - May d/c guevara after pt has been afebrile for more than 24hrs  - Pt will need outpatient definitive stone treatement w/ Dr. Edmondson     #HTN  #Hypothyroidism  #HLD/CAD  #s/p heart transplant   - remainder of care as per ICU team

## 2024-10-13 LAB
-  AMPICILLIN/SULBACTAM: SIGNIFICANT CHANGE UP
-  AMPICILLIN: SIGNIFICANT CHANGE UP
-  AZTREONAM: SIGNIFICANT CHANGE UP
-  CEFAZOLIN: SIGNIFICANT CHANGE UP
-  CEFEPIME: SIGNIFICANT CHANGE UP
-  CEFOXITIN: SIGNIFICANT CHANGE UP
-  CEFTRIAXONE: SIGNIFICANT CHANGE UP
-  CIPROFLOXACIN: SIGNIFICANT CHANGE UP
-  ERTAPENEM: SIGNIFICANT CHANGE UP
-  GENTAMICIN: SIGNIFICANT CHANGE UP
-  IMIPENEM: SIGNIFICANT CHANGE UP
-  LEVOFLOXACIN: SIGNIFICANT CHANGE UP
-  MEROPENEM: SIGNIFICANT CHANGE UP
-  PIPERACILLIN/TAZOBACTAM: SIGNIFICANT CHANGE UP
-  TOBRAMYCIN: SIGNIFICANT CHANGE UP
-  TRIMETHOPRIM/SULFAMETHOXAZOLE: SIGNIFICANT CHANGE UP
ALBUMIN SERPL ELPH-MCNC: 2.8 G/DL — LOW (ref 3.5–5.2)
ALP SERPL-CCNC: 65 U/L — SIGNIFICANT CHANGE UP (ref 30–115)
ALT FLD-CCNC: 25 U/L — SIGNIFICANT CHANGE UP (ref 0–41)
ANION GAP SERPL CALC-SCNC: 10 MMOL/L — SIGNIFICANT CHANGE UP (ref 7–14)
AST SERPL-CCNC: 22 U/L — SIGNIFICANT CHANGE UP (ref 0–41)
BASOPHILS # BLD AUTO: 0 K/UL — SIGNIFICANT CHANGE UP (ref 0–0.2)
BASOPHILS NFR BLD AUTO: 0 % — SIGNIFICANT CHANGE UP (ref 0–1)
BILIRUB SERPL-MCNC: 0.3 MG/DL — SIGNIFICANT CHANGE UP (ref 0.2–1.2)
BUN SERPL-MCNC: 19 MG/DL — SIGNIFICANT CHANGE UP (ref 10–20)
CALCIUM SERPL-MCNC: 7 MG/DL — LOW (ref 8.4–10.5)
CHLORIDE SERPL-SCNC: 110 MMOL/L — SIGNIFICANT CHANGE UP (ref 98–110)
CO2 SERPL-SCNC: 22 MMOL/L — SIGNIFICANT CHANGE UP (ref 17–32)
CREAT SERPL-MCNC: 0.7 MG/DL — SIGNIFICANT CHANGE UP (ref 0.7–1.5)
CULTURE RESULTS: ABNORMAL
CULTURE RESULTS: ABNORMAL
EGFR: 94 ML/MIN/1.73M2 — SIGNIFICANT CHANGE UP
EOSINOPHIL NFR BLD AUTO: 0 % — SIGNIFICANT CHANGE UP (ref 0–8)
GLUCOSE SERPL-MCNC: 202 MG/DL — HIGH (ref 70–99)
HCT VFR BLD CALC: 35.9 % — LOW (ref 37–47)
HGB BLD-MCNC: 11.6 G/DL — LOW (ref 12–16)
LYMPHOCYTES # BLD AUTO: 1.04 K/UL — LOW (ref 1.2–3.4)
LYMPHOCYTES # BLD AUTO: 5 % — LOW (ref 20.5–51.1)
MAGNESIUM SERPL-MCNC: 2.2 MG/DL — SIGNIFICANT CHANGE UP (ref 1.8–2.4)
MCHC RBC-ENTMCNC: 30.1 PG — SIGNIFICANT CHANGE UP (ref 27–31)
MCHC RBC-ENTMCNC: 32.3 G/DL — SIGNIFICANT CHANGE UP (ref 32–37)
MCV RBC AUTO: 93.2 FL — SIGNIFICANT CHANGE UP (ref 81–99)
METHOD TYPE: SIGNIFICANT CHANGE UP
MONOCYTES # BLD AUTO: 0.83 K/UL — HIGH (ref 0.1–0.6)
MONOCYTES NFR BLD AUTO: 4 % — SIGNIFICANT CHANGE UP (ref 1.7–9.3)
NEUTROPHILS # BLD AUTO: 18.96 K/UL — HIGH (ref 1.4–6.5)
NEUTROPHILS NFR BLD AUTO: 68 % — SIGNIFICANT CHANGE UP (ref 42.2–75.2)
NEUTS BAND # BLD: 23 % — HIGH (ref 0–6)
NEUTS VAC BLD QL SMEAR: SLIGHT — SIGNIFICANT CHANGE UP
NRBC # BLD: 0 /100 WBCS — SIGNIFICANT CHANGE UP (ref 0–0)
NRBC # BLD: SIGNIFICANT CHANGE UP /100 WBCS (ref 0–0)
ORGANISM # SPEC MICROSCOPIC CNT: ABNORMAL
ORGANISM # SPEC MICROSCOPIC CNT: ABNORMAL
ORGANISM # SPEC MICROSCOPIC CNT: SIGNIFICANT CHANGE UP
PHOSPHATE SERPL-MCNC: 2.2 MG/DL — SIGNIFICANT CHANGE UP (ref 2.1–4.9)
PLAT MORPH BLD: NORMAL — SIGNIFICANT CHANGE UP
PLATELET # BLD AUTO: 163 K/UL — SIGNIFICANT CHANGE UP (ref 130–400)
PLATELET CLUMP BLD QL SMEAR: SLIGHT
PLATELET COUNT - ESTIMATE: NORMAL — SIGNIFICANT CHANGE UP
PMV BLD: 11.3 FL — HIGH (ref 7.4–10.4)
POIKILOCYTOSIS BLD QL AUTO: SLIGHT — SIGNIFICANT CHANGE UP
POTASSIUM SERPL-MCNC: 3.8 MMOL/L — SIGNIFICANT CHANGE UP (ref 3.5–5)
POTASSIUM SERPL-SCNC: 3.8 MMOL/L — SIGNIFICANT CHANGE UP (ref 3.5–5)
PROT SERPL-MCNC: 4.6 G/DL — LOW (ref 6–8)
RBC # BLD: 3.85 M/UL — LOW (ref 4.2–5.4)
RBC # FLD: 13.3 % — SIGNIFICANT CHANGE UP (ref 11.5–14.5)
RBC BLD AUTO: ABNORMAL
SODIUM SERPL-SCNC: 142 MMOL/L — SIGNIFICANT CHANGE UP (ref 135–146)
SPECIMEN SOURCE: SIGNIFICANT CHANGE UP
SPECIMEN SOURCE: SIGNIFICANT CHANGE UP
TOXIC GRANULES BLD QL SMEAR: PRESENT — SIGNIFICANT CHANGE UP
WBC # BLD: 20.83 K/UL — HIGH (ref 4.8–10.8)
WBC # FLD AUTO: 20.83 K/UL — HIGH (ref 4.8–10.8)

## 2024-10-13 PROCEDURE — 99233 SBSQ HOSP IP/OBS HIGH 50: CPT

## 2024-10-13 PROCEDURE — 71045 X-RAY EXAM CHEST 1 VIEW: CPT | Mod: 26

## 2024-10-13 RX ORDER — PREDNISONE 5 MG/1
2.5 TABLET ORAL DAILY
Refills: 0 | Status: DISCONTINUED | OUTPATIENT
Start: 2024-10-14 | End: 2024-10-14

## 2024-10-13 RX ORDER — ALBUTEROL 90 MCG
2.5 AEROSOL (GRAM) INHALATION EVERY 6 HOURS
Refills: 0 | Status: DISCONTINUED | OUTPATIENT
Start: 2024-10-13 | End: 2024-10-14

## 2024-10-13 RX ORDER — CEFTRIAXONE SODIUM 1 G
2000 VIAL (EA) INJECTION EVERY 24 HOURS
Refills: 0 | Status: DISCONTINUED | OUTPATIENT
Start: 2024-10-13 | End: 2024-10-14

## 2024-10-13 RX ADMIN — Medication 5000 UNIT(S): at 05:16

## 2024-10-13 RX ADMIN — TACROLIMUS 1.5 MILLIGRAM(S): 4 TABLET, EXTENDED RELEASE ORAL at 16:52

## 2024-10-13 RX ADMIN — Medication 20 MILLIGRAM(S): at 11:03

## 2024-10-13 RX ADMIN — MYCOPHENOLIC ACID 540 MILLIGRAM(S): 180 TABLET, DELAYED RELEASE ORAL at 16:52

## 2024-10-13 RX ADMIN — Medication 81 MILLIGRAM(S): at 11:03

## 2024-10-13 RX ADMIN — CHLORHEXIDINE GLUCONATE ORAL RINSE 1 APPLICATION(S): 1.2 SOLUTION DENTAL at 05:18

## 2024-10-13 RX ADMIN — MYCOPHENOLIC ACID 540 MILLIGRAM(S): 180 TABLET, DELAYED RELEASE ORAL at 05:25

## 2024-10-13 RX ADMIN — Medication 5000 UNIT(S): at 17:04

## 2024-10-13 RX ADMIN — ROSUVASTATIN CALCIUM 20 MILLIGRAM(S): 20 TABLET, COATED ORAL at 21:39

## 2024-10-13 RX ADMIN — PIPERACILLIN SODIUM AND TAZOBACTAM SODIUM 25 GRAM(S): 12; 1.5 INJECTION, POWDER, LYOPHILIZED, FOR SOLUTION INTRAVENOUS at 05:18

## 2024-10-13 RX ADMIN — TACROLIMUS 1.5 MILLIGRAM(S): 4 TABLET, EXTENDED RELEASE ORAL at 05:17

## 2024-10-13 RX ADMIN — Medication 100 MILLIGRAM(S): at 13:48

## 2024-10-13 RX ADMIN — HYDROCORTISONE 100 MILLIGRAM(S): 5 TABLET ORAL at 05:18

## 2024-10-13 RX ADMIN — Medication 137 MICROGRAM(S): at 05:17

## 2024-10-13 NOTE — PROGRESS NOTE ADULT - SUBJECTIVE AND OBJECTIVE BOX
Patient is a 69y old  Female who presents with a chief complaint of Kidney stone (11 Oct 2024 06:55)      Over Night Events:  Patient seen and examined.   comfortable mild wheeze     ROS:  See HPI    PHYSICAL EXAM    ICU Vital Signs Last 24 Hrs  T(C): 35.9 (12 Oct 2024 23:00), Max: 36.1 (12 Oct 2024 15:00)  T(F): 96.6 (12 Oct 2024 23:00), Max: 97 (12 Oct 2024 15:00)  HR: 80 (13 Oct 2024 05:12) (69 - 94)  BP: 128/61 (13 Oct 2024 05:12) (90/54 - 141/60)  BP(mean): 88 (13 Oct 2024 05:12) (66 - 88)  ABP: --  ABP(mean): --  RR: 20 (13 Oct 2024 05:12) (20 - 49)  SpO2: 96% (13 Oct 2024 05:12) (94% - 98%)    O2 Parameters below as of 13 Oct 2024 05:12  Patient On (Oxygen Delivery Method): room air            General:awake   HEENT:        trevon        Lymph Nodes: NO cervical LN   Lungs: mild Bilateral wheeze   Cardiovascular: Regular   Abdomen: Soft, Positive BS  Extremities: No clubbing   Skin: warm   Neurological: no focal   Musculoskeletal: move all ext     I&O's Detail    11 Oct 2024 07:01  -  12 Oct 2024 07:00  --------------------------------------------------------  IN:    IV PiggyBack: 400 mL    Oral Fluid: 540 mL    sodium chloride 0.9%: 2400 mL  Total IN: 3340 mL    OUT:    Indwelling Catheter - Urethral (mL): 2150 mL  Total OUT: 2150 mL    Total NET: 1190 mL      12 Oct 2024 07:01  -  13 Oct 2024 06:44  --------------------------------------------------------  IN:    IV PiggyBack: 200 mL    Oral Fluid: 240 mL    sodium chloride 0.9%: 500 mL  Total IN: 940 mL    OUT:    Indwelling Catheter - Urethral (mL): 320 mL    Voided (mL): 805 mL  Total OUT: 1125 mL    Total NET: -185 mL          LABS:                          12.8   24.84 )-----------( 153      ( 12 Oct 2024 09:00 )             40.4         12 Oct 2024 06:16    144    |  112    |  17     ----------------------------<  146    4.1     |  25     |  0.9      Ca    7.0        12 Oct 2024 06:16  Phos  2.7       12 Oct 2024 06:16  Mg     1.6       12 Oct 2024 06:16                                                                                      Urinalysis Basic - ( 12 Oct 2024 06:16 )    Color: x / Appearance: x / SG: x / pH: x  Gluc: 146 mg/dL / Ketone: x  / Bili: x / Urobili: x   Blood: x / Protein: x / Nitrite: x   Leuk Esterase: x / RBC: x / WBC x   Sq Epi: x / Non Sq Epi: x / Bacteria: x        Lactate, Blood: 1.0 mmol/L (10-12-24 @ 06:16)  Lactate, Blood: 2.2 mmol/L (10-11-24 @ 02:10)  Lactate, Blood: 3.1 mmol/L (10-10-24 @ 23:49)                                                          Culture - Urine (collected 11 Oct 2024 01:45)  Source: Clean Catch Clean Catch (Midstream)  Preliminary Report (13 Oct 2024 04:14):    10,000 - 49,000 CFU/mL Escherichia coli    Culture - Blood (collected 10 Oct 2024 23:49)  Source: .Blood BLOOD  Gram Stain (11 Oct 2024 19:33):    Growth in anaerobic bottle: Gram Negative Rods    Growth in aerobic bottle: Gram Negative Rods  Preliminary Report (12 Oct 2024 14:53):    Growth in aerobic and anaerobic bottles: Escherichia coli    Susceptibility to follow.    Direct identification is available within approximately 3-5    hours either by Blood Panel Multiplexed PCR or Direct    MALDI-TOF. Details: [email or web address]  Organism: Blood Culture PCR (11 Oct 2024 20:45)  Organism: Blood Culture PCR (11 Oct 2024 20:45)    Culture - Blood (collected 10 Oct 2024 23:49)  Source: .Blood BLOOD  Gram Stain (11 Oct 2024 19:32):    Growth in aerobic and anaerobic bottles: Gram Negative Rods  Preliminary Report (12 Oct 2024 14:37):    Growth in aerobic and anaerobic bottles: Escherichia coli    See previous culture [id number]                                                                                           MEDICATIONS  (STANDING):  aspirin  chewable 81 milliGRAM(s) Oral daily  atorvastatin 10 milliGRAM(s) Oral at bedtime  chlorhexidine 2% Cloths 1 Application(s) Topical <User Schedule>  famotidine    Tablet 20 milliGRAM(s) Oral daily  heparin   Injectable 5000 Unit(s) SubCutaneous every 12 hours  hydrocortisone sodium succinate Injectable 100 milliGRAM(s) IV Push three times a day  levothyroxine 137 MICROGram(s) Oral daily  mycophenolic acid  milliGRAM(s) Oral two times a day  piperacillin/tazobactam IVPB.. 3.375 Gram(s) IV Intermittent every 8 hours  rosuvastatin 20 milliGRAM(s) Oral at bedtime  tacrolimus 1.5 milliGRAM(s) Oral two times a day    MEDICATIONS  (PRN):          Xrays:  TLC:  OG:  ET tube:                                                                                       ECHO:  CAM ICU:

## 2024-10-13 NOTE — PROGRESS NOTE ADULT - ASSESSMENT
A/P:   kidney stone hydronephrosis s/p ureteral stent   bacteremia   UTI   cardiac transplant       PLAN:    CNS: no sedation     HEENT: oral care     PULMONARY: keep pox >92%   do cxr today   start albuterol nebulizer Q6 hrs prn     CARDIOVASCULAR: continue home medication   follow cardiology recommendation     d/c iv fluid     GI: GI prophylaxis.  Feeding     RENAL:follow morning bun, cr   follow Urology   INFECTIOUS DISEASE: on zosyn follow cx   ID eval     HEMATOLOGICAL:  DVT prophylaxis.    ENDOCRINE:  Follow up FS.  Insulin protocol if needed.    from pulmonary can downgrade to floor if ok by cardiology recall prn         CRITICAL CARE TIME SPENT: ***

## 2024-10-13 NOTE — PROGRESS NOTE ADULT - ASSESSMENT
69-year-old female PMH CAD S/p heart transplant 7 years ago, HTN,  anxiety, hypothyroidism, Hysterectomy, HLD admitted for septic ureteral stone. CT A/P: Mild right hydroureteronephrosis secondary to an obstructing calculus at  the UVJ measuring 2 x 3 x 3 mm..    Ecoli bacteremia / sepsis POA - resolved /  right hydroureteronephrosis secondary to an obstructing calculus at  the UVJ     -  s/p Cystoscopic insertion of ureteral stent    - may DC IV Solu-cortef and resume home prednisone dose   - may change antibiotics to Rocephin 2gm daily   - check TTE   - continue home meds   - I discussed plan with medical resident   - DVT prophylaxis    - 50 minutes total spent today on patient care

## 2024-10-13 NOTE — PROGRESS NOTE ADULT - SUBJECTIVE AND OBJECTIVE BOX
Patient is a 69y old  Female who presents with a chief complaint of Kidney stone (11 Oct 2024 06:55)      T(F): 96.9 (10-13-24 @ 07:05), Max: 97 (10-12-24 @ 15:00)  HR: 74 (10-13-24 @ 07:25)  BP: 133/70 (10-13-24 @ 07:25)  RR: 18 (10-13-24 @ 07:25)  SpO2: 93% (10-13-24 @ 07:25) (93% - 96%)    PHYSICAL EXAM:  GENERAL: NAD  HEAD:  Atraumatic, Normocephalic  EYES: EOMI, PERRLA, conjunctiva and sclera clear  NERVOUS SYSTEM:  Alert & Oriented X3, no focal deficits   CHEST/LUNG: Clear to percussion bilaterally; No rales, rhonchi, wheezing, or rubs  HEART: Regular rate and rhythm; No murmurs, rubs, or gallops  ABDOMEN: Soft, Nontender, Nondistended; Bowel sounds present  EXTREMITIES:  2+ Peripheral Pulses, No clubbing, cyanosis, or edema    LABS  10-13    142  |  110  |  19  ----------------------------<  202[H]  3.8   |  22  |  0.7    Ca    7.0[L]      13 Oct 2024 06:03  Phos  2.2     10-13  Mg     2.2     10-13    TPro  4.6[L]  /  Alb  2.8[L]  /  TBili  0.3  /  DBili  x   /  AST  22  /  ALT  25  /  AlkPhos  65  10-13                          11.6   20.83 )-----------( 163      ( 13 Oct 2024 06:03 )             35.9       Culture Results:   10,000 - 49,000 CFU/mL Escherichia coli (10-11-24)  Culture Results:   Growth in aerobic and anaerobic bottles: Escherichia coli  See previous culture [id number] (10-10-24)  Culture Results:   Growth in aerobic and anaerobic bottles: Escherichia coli  Direct identification is available within approximately 3-5  hours either by Blood Panel Multiplexed PCR or Direct  MALDI-TOF. Details: [email or web address] (10-10-24)    RADIOLOGY  < from: CT Abdomen and Pelvis w/ IV Cont (10.11.24 @ 00:43) >    IMPRESSION:    Mild right hydroureteronephrosis secondary to an obstructing calculus at   the UVJ measuring 2 x 3 x 3 mm..    < end of copied text >    MEDICATIONS  (STANDING):  aspirin  chewable 81 milliGRAM(s) Oral daily  atorvastatin 10 milliGRAM(s) Oral at bedtime  chlorhexidine 2% Cloths 1 Application(s) Topical <User Schedule>  famotidine    Tablet 20 milliGRAM(s) Oral daily  heparin   Injectable 5000 Unit(s) SubCutaneous every 12 hours  hydrocortisone sodium succinate Injectable 100 milliGRAM(s) IV Push three times a day  levothyroxine 137 MICROGram(s) Oral daily  mycophenolic acid  milliGRAM(s) Oral two times a day  rosuvastatin 20 milliGRAM(s) Oral at bedtime  tacrolimus 1.5 milliGRAM(s) Oral two times a day    MEDICATIONS  (PRN):  albuterol    0.083% 2.5 milliGRAM(s) Nebulizer every 6 hours PRN Shortness of Breath and/or Wheezing      Patient seen and evaluated this am, comfortable in bed, no complaints       T(F): 96.9 (10-13-24 @ 07:05), Max: 97 (10-12-24 @ 15:00)  HR: 74 (10-13-24 @ 07:25)  BP: 133/70 (10-13-24 @ 07:25)  RR: 18 (10-13-24 @ 07:25)  SpO2: 93% (10-13-24 @ 07:25) (93% - 96%)    PHYSICAL EXAM:  GENERAL: NAD  HEAD:  Atraumatic, Normocephalic  EYES: EOMI, PERRLA, conjunctiva and sclera clear  NERVOUS SYSTEM:  Alert & Oriented X3, no focal deficits   CHEST/LUNG: Clear to percussion bilaterally; No rales, rhonchi, wheezing, or rubs  HEART: Regular rate and rhythm; No murmurs, rubs, or gallops  ABDOMEN: Soft, Nontender, Nondistended; Bowel sounds present  EXTREMITIES:  2+ Peripheral Pulses, No clubbing, cyanosis, or edema    LABS  10-13    142  |  110  |  19  ----------------------------<  202[H]  3.8   |  22  |  0.7    Ca    7.0[L]      13 Oct 2024 06:03  Phos  2.2     10-13  Mg     2.2     10-13    TPro  4.6[L]  /  Alb  2.8[L]  /  TBili  0.3  /  DBili  x   /  AST  22  /  ALT  25  /  AlkPhos  65  10-13                          11.6   20.83 )-----------( 163      ( 13 Oct 2024 06:03 )             35.9       Culture Results:   10,000 - 49,000 CFU/mL Escherichia coli (10-11-24)  Culture Results:   Growth in aerobic and anaerobic bottles: Escherichia coli  See previous culture [id number] (10-10-24)  Culture Results:   Growth in aerobic and anaerobic bottles: Escherichia coli  Direct identification is available within approximately 3-5  hours either by Blood Panel Multiplexed PCR or Direct  MALDI-TOF. Details: [email or web address] (10-10-24)    RADIOLOGY  < from: CT Abdomen and Pelvis w/ IV Cont (10.11.24 @ 00:43) >    IMPRESSION:    Mild right hydroureteronephrosis secondary to an obstructing calculus at   the UVJ measuring 2 x 3 x 3 mm..    < end of copied text >    MEDICATIONS  (STANDING):  aspirin  chewable 81 milliGRAM(s) Oral daily  atorvastatin 10 milliGRAM(s) Oral at bedtime  chlorhexidine 2% Cloths 1 Application(s) Topical <User Schedule>  famotidine    Tablet 20 milliGRAM(s) Oral daily  heparin   Injectable 5000 Unit(s) SubCutaneous every 12 hours  hydrocortisone sodium succinate Injectable 100 milliGRAM(s) IV Push three times a day  levothyroxine 137 MICROGram(s) Oral daily  mycophenolic acid  milliGRAM(s) Oral two times a day  rosuvastatin 20 milliGRAM(s) Oral at bedtime  tacrolimus 1.5 milliGRAM(s) Oral two times a day    MEDICATIONS  (PRN):  albuterol    0.083% 2.5 milliGRAM(s) Nebulizer every 6 hours PRN Shortness of Breath and/or Wheezing

## 2024-10-14 ENCOUNTER — RESULT REVIEW (OUTPATIENT)
Age: 69
End: 2024-10-14

## 2024-10-14 ENCOUNTER — TRANSCRIPTION ENCOUNTER (OUTPATIENT)
Age: 69
End: 2024-10-14

## 2024-10-14 VITALS — RESPIRATION RATE: 18 BRPM | DIASTOLIC BLOOD PRESSURE: 81 MMHG | SYSTOLIC BLOOD PRESSURE: 150 MMHG | HEART RATE: 74 BPM

## 2024-10-14 LAB
ALBUMIN SERPL ELPH-MCNC: 3.2 G/DL — LOW (ref 3.5–5.2)
ALP SERPL-CCNC: 78 U/L — SIGNIFICANT CHANGE UP (ref 30–115)
ALT FLD-CCNC: 34 U/L — SIGNIFICANT CHANGE UP (ref 0–41)
ANION GAP SERPL CALC-SCNC: 9 MMOL/L — SIGNIFICANT CHANGE UP (ref 7–14)
AST SERPL-CCNC: 29 U/L — SIGNIFICANT CHANGE UP (ref 0–41)
BASOPHILS # BLD AUTO: 0.05 K/UL — SIGNIFICANT CHANGE UP (ref 0–0.2)
BASOPHILS NFR BLD AUTO: 0.3 % — SIGNIFICANT CHANGE UP (ref 0–1)
BILIRUB SERPL-MCNC: <0.2 MG/DL — SIGNIFICANT CHANGE UP (ref 0.2–1.2)
BUN SERPL-MCNC: 19 MG/DL — SIGNIFICANT CHANGE UP (ref 10–20)
CALCIUM SERPL-MCNC: 7.8 MG/DL — LOW (ref 8.4–10.5)
CHLORIDE SERPL-SCNC: 106 MMOL/L — SIGNIFICANT CHANGE UP (ref 98–110)
CMV DNA CSF QL NAA+PROBE: SIGNIFICANT CHANGE UP IU/ML
CMV DNA SPEC NAA+PROBE-LOG#: SIGNIFICANT CHANGE UP LOG10IU/ML
CO2 SERPL-SCNC: 29 MMOL/L — SIGNIFICANT CHANGE UP (ref 17–32)
CREAT SERPL-MCNC: 0.7 MG/DL — SIGNIFICANT CHANGE UP (ref 0.7–1.5)
EGFR: 94 ML/MIN/1.73M2 — SIGNIFICANT CHANGE UP
EOSINOPHIL # BLD AUTO: 0.42 K/UL — SIGNIFICANT CHANGE UP (ref 0–0.7)
EOSINOPHIL NFR BLD AUTO: 2.6 % — SIGNIFICANT CHANGE UP (ref 0–8)
GLUCOSE SERPL-MCNC: 169 MG/DL — HIGH (ref 70–99)
HCT VFR BLD CALC: 39.4 % — SIGNIFICANT CHANGE UP (ref 37–47)
HGB BLD-MCNC: 12.7 G/DL — SIGNIFICANT CHANGE UP (ref 12–16)
IMM GRANULOCYTES NFR BLD AUTO: 1.9 % — HIGH (ref 0.1–0.3)
LYMPHOCYTES # BLD AUTO: 15.4 % — LOW (ref 20.5–51.1)
LYMPHOCYTES # BLD AUTO: 2.45 K/UL — SIGNIFICANT CHANGE UP (ref 1.2–3.4)
MAGNESIUM SERPL-MCNC: 1.8 MG/DL — SIGNIFICANT CHANGE UP (ref 1.8–2.4)
MCHC RBC-ENTMCNC: 29.7 PG — SIGNIFICANT CHANGE UP (ref 27–31)
MCHC RBC-ENTMCNC: 32.2 G/DL — SIGNIFICANT CHANGE UP (ref 32–37)
MCV RBC AUTO: 92.1 FL — SIGNIFICANT CHANGE UP (ref 81–99)
MONOCYTES # BLD AUTO: 0.81 K/UL — HIGH (ref 0.1–0.6)
MONOCYTES NFR BLD AUTO: 5.1 % — SIGNIFICANT CHANGE UP (ref 1.7–9.3)
NEUTROPHILS # BLD AUTO: 11.89 K/UL — HIGH (ref 1.4–6.5)
NEUTROPHILS NFR BLD AUTO: 74.7 % — SIGNIFICANT CHANGE UP (ref 42.2–75.2)
NRBC # BLD: 0 /100 WBCS — SIGNIFICANT CHANGE UP (ref 0–0)
PLATELET # BLD AUTO: 201 K/UL — SIGNIFICANT CHANGE UP (ref 130–400)
PMV BLD: 10.6 FL — HIGH (ref 7.4–10.4)
POTASSIUM SERPL-MCNC: 4 MMOL/L — SIGNIFICANT CHANGE UP (ref 3.5–5)
POTASSIUM SERPL-SCNC: 4 MMOL/L — SIGNIFICANT CHANGE UP (ref 3.5–5)
PROT SERPL-MCNC: 5.2 G/DL — LOW (ref 6–8)
RBC # BLD: 4.28 M/UL — SIGNIFICANT CHANGE UP (ref 4.2–5.4)
RBC # FLD: 13.3 % — SIGNIFICANT CHANGE UP (ref 11.5–14.5)
SODIUM SERPL-SCNC: 144 MMOL/L — SIGNIFICANT CHANGE UP (ref 135–146)
WBC # BLD: 15.93 K/UL — HIGH (ref 4.8–10.8)
WBC # FLD AUTO: 15.93 K/UL — HIGH (ref 4.8–10.8)

## 2024-10-14 PROCEDURE — 99239 HOSP IP/OBS DSCHRG MGMT >30: CPT

## 2024-10-14 PROCEDURE — 93306 TTE W/DOPPLER COMPLETE: CPT | Mod: 26

## 2024-10-14 RX ORDER — PREDNISONE 5 MG/1
1 TABLET ORAL
Refills: 0 | DISCHARGE

## 2024-10-14 RX ORDER — PSYLLIUM HUSK 0.4 G
1 CAPSULE ORAL
Refills: 0 | DISCHARGE

## 2024-10-14 RX ORDER — ENOXAPARIN SODIUM 150 MG/ML
40 INJECTION SUBCUTANEOUS EVERY 24 HOURS
Refills: 0 | Status: DISCONTINUED | OUTPATIENT
Start: 2024-10-14 | End: 2024-10-14

## 2024-10-14 RX ORDER — ASPIRIN 325 MG
1 TABLET ORAL
Refills: 0 | DISCHARGE

## 2024-10-14 RX ADMIN — CHLORHEXIDINE GLUCONATE ORAL RINSE 1 APPLICATION(S): 1.2 SOLUTION DENTAL at 05:32

## 2024-10-14 RX ADMIN — Medication 137 MICROGRAM(S): at 05:31

## 2024-10-14 RX ADMIN — TACROLIMUS 1.5 MILLIGRAM(S): 4 TABLET, EXTENDED RELEASE ORAL at 05:31

## 2024-10-14 RX ADMIN — MYCOPHENOLIC ACID 540 MILLIGRAM(S): 180 TABLET, DELAYED RELEASE ORAL at 05:38

## 2024-10-14 RX ADMIN — PREDNISONE 2.5 MILLIGRAM(S): 5 TABLET ORAL at 05:31

## 2024-10-14 RX ADMIN — Medication 100 MILLIGRAM(S): at 12:04

## 2024-10-14 RX ADMIN — Medication 20 MILLIGRAM(S): at 12:04

## 2024-10-14 RX ADMIN — Medication 5000 UNIT(S): at 05:31

## 2024-10-14 RX ADMIN — Medication 81 MILLIGRAM(S): at 12:04

## 2024-10-14 NOTE — DISCHARGE NOTE PROVIDER - NSDCCPCAREPLAN_GEN_ALL_CORE_FT
PRINCIPAL DISCHARGE DIAGNOSIS  Diagnosis: Sepsis  Assessment and Plan of Treatment: You were found to have an obstructing kidney stone and had a stent placed. Your blood and urine cultures grew bacteria and you were treated with antibiotics. Continue taking anitbioitcs until October 24th as prescribed. Follow-up with your Urologist and PCP. Continue taking all medications as prescribed. Repeat bloodwork in 1 week. Return to the ED should you experience any fevers or worsening pain.      SECONDARY DISCHARGE DIAGNOSES  Diagnosis: Urinary tract obstruction due to kidney stone  Assessment and Plan of Treatment:

## 2024-10-14 NOTE — DISCHARGE NOTE PROVIDER - NSDCMRMEDTOKEN_GEN_ALL_CORE_FT
ALPRAZolam 0.25 mg oral tablet: 1 tab(s) orally 3 times a day, As Needed  amLODIPine 5 mg oral tablet: 1 tab(s) orally once a day  aspirin 81 mg oral tablet: 81 milligram(s) orally once a day once daily  Bactrim  mg-160 mg oral tablet: 2 tab(s) orally 2 times a day  escitalopram 10 mg oral tablet: 1 tab(s) orally once a day  famotidine 20 mg oral tablet: 1 tab(s) orally once a day  levothyroxine 137 mcg (0.137 mg) oral tablet: 1 tab(s) orally once a day  magnesium oxide 400 mg (241.3 mg elemental magnesium) oral tablet: 1 tab(s) orally 2 times a day  Mycophenolate Mofetil: 540 milligram(s) orally 2 times a day  Ozempic 2 mg/1.5 mL (1 mg dose) subcutaneous solution: 1 milligram(s) subcutaneous once a week  predniSONE 2.5 mg oral tablet: 1 tab(s) orally once a day  rosuvastatin 20 mg oral tablet: 1 tab(s) orally once a day (at bedtime)  tacrolimus 1 mg oral capsule: 1.5 tab(s) orally 2 times a day  valGANciclovir 450 mg oral tablet: 2 tab(s) orally once a day

## 2024-10-14 NOTE — DISCHARGE NOTE PROVIDER - CARE PROVIDERS DIRECT ADDRESSES
[email or web address]

## 2024-10-14 NOTE — PHARMACOTHERAPY INTERVENTION NOTE - COMMENTS
Patient with history of heart transplant currently on tacrolimus 1.5mg twice daily. Recommended to order Prograf level to monitor levels. 
To adjust for BMI> 30 kg/m2 , recommended to switch from subQ heparin 5000 units q12h to Lovenox 40mg q24h (crcl: 76)

## 2024-10-14 NOTE — CONSULT NOTE ADULT - ASSESSMENT
69-year-old female PMH CAD S/p heart transplant 7 years ago, HTN,  anxiety, hypothyroidism, Hysterectomy, HLD presenting to ED for evaluation of generalized weakness, fever, chills, nausea, vomiting, abdominal pain over the past 2 days.       ID is consulted for bacteremia  Febrile to 103.7 on admission, afebrile since  WBC 2.59 > 20.35 > 20.83  On room air  BCx 10/10 E. coli, (pan-sensitive)  UA with pyuria, UCx E. coli    CT A/P 10/10  Mild right hydroureteronephrosis secondary to an obstructing calculus at   the UVJ measuring 2 x 3 x 3 mm.    10/10 S/p Cystoscopic insertion of ureteral stent    Antibiotics:  Zosyn 10/11 - 10/13  Ceftriaxone 10/13 ->      IMPRESSION:  E. coli bacteremia  Right pyelonephritis  Obstructive uropathy  Leukocytosis  Heart transplant on Prednisone, myfortic, tacrolimus    RECOMMENDATIONS:      * THIS IS AN INCOMPLETE NOTE. FINAL RECOMMENDATION IS PENDING *   69-year-old female PMH CAD S/p heart transplant 7 years ago, HTN,  anxiety, hypothyroidism, Hysterectomy, HLD presenting to ED for evaluation of generalized weakness, fever, chills, nausea, vomiting, abdominal pain over the past 2 days.       ID is consulted for bacteremia  Febrile to 103.7 on admission, afebrile since  WBC 2.59 > 20.35 > 20.83  On room air  BCx 10/10 E. coli, (pan-sensitive)  UA with pyuria, UCx E. coli    CT A/P 10/10  Mild right hydroureteronephrosis secondary to an obstructing calculus at   the UVJ measuring 2 x 3 x 3 mm.    10/10 S/p Cystoscopic insertion of ureteral stent    Antibiotics:  Zosyn 10/11 - 10/13  Ceftriaxone 10/13 ->      IMPRESSION:  E. coli bacteremia  Right pyelonephritis  Obstructive uropathy  Leukocytosis  Heart transplant on Prednisone, myfortic, tacrolimus    RECOMMENDATIONS:  - Can discharged on PO Bactrim 2DS q12hrs to complete 14-day treatment from PO (until 10/24)  - Adverse effects discussed with patient  - Follow up with PCP for blood work (CBC, BMP)  - Outpatient Urology follow up  - Will sign off. Please recall ID PRN for further questions      Irma Johnson D.O.  Attending Physician  Division of Infectious Diseases  [facility]  Please contact me via [employer] Teams

## 2024-10-14 NOTE — DISCHARGE NOTE PROVIDER - PROVIDER TOKENS
PROVIDER:[TOKEN:[[id number]],FOLLOWUP:[1 week]],PROVIDER:[TOKEN:[[id number]],FOLLOWUP:[1 week]]

## 2024-10-14 NOTE — DISCHARGE NOTE PROVIDER - HOSPITAL COURSE
69-year-old female PMH CAD S/p heart transplant 7 years ago, HTN,  anxiety, hypothyroidism, Hysterectomy, HLD admitted for septic ureteral stone. CT A/P: Mild right hydroureteronephrosis secondary to an obstructing calculus at  the UVJ measuring 2 x 3 x 3 mm. Pt had stent placed. Received Rocephin; blood and urine cultures growing E. coli. Pt is stable for discharge with ID and Urology follow-up.     # Ecoli bacteremia  # right hydroureteronephrosis secondary to an obstructing calculus at the UVJ  - sepsis poa  -  s/p Cystoscopic insertion of ureteral stent   - blood and urine cultures: pan-sensitive E.coli   - TTE: EF 62%, no vegetations.   - Rocephin while in-patient  - d/c on Bactrim 2 DS q12 for 14 days sp procedure (End date: 24th)   - Urology fu out-patient  - needs repeat CBC in 1 week    # Hx heart transplant   -  continue Prednisone, Tacrolimus, Mycophenolate     #HTN  - continue Norvasc 5mg QD    #Hypothyroidism  - Continue synthroid 112 mcg QD    #HLD/CAD  - Continue lipitor 20mg HS

## 2024-10-14 NOTE — DISCHARGE NOTE PROVIDER - ATTENDING DISCHARGE PHYSICAL EXAMINATION:
PHYSICAL EXAM:  Vital Signs Last 24 Hrs  T(C): 36.4 (14 Oct 2024 07:01), Max: 36.5 (13 Oct 2024 23:00)  T(F): 97.5 (14 Oct 2024 07:01), Max: 97.7 (13 Oct 2024 23:00)  HR: 74 (14 Oct 2024 07:07) (70 - 85)  BP: 150/81 (14 Oct 2024 07:07) (148/64 - 150/81)  BP(mean): 110 (14 Oct 2024 07:07) (92 - 110)  RR: 18 (14 Oct 2024 07:07) (18 - 20)  SpO2: 95% (14 Oct 2024 05:20) (95% - 98%)    Parameters below as of 14 Oct 2024 05:20  Patient On (Oxygen Delivery Method): room air      GENERAL: NAD, well-groomed, well-developed  HEAD:  Atraumatic, Normocephalic  EYES: EOMI, PERRLA, conjunctiva and sclera clear  ENMT: No tonsillar erythema, exudates, or enlargement; Moist mucous membranes, Good dentition, No lesions  NECK: Supple, No JVD, Normal thyroid  NERVOUS SYSTEM:  Alert & Oriented X3, Good concentration; Motor Strength 5/5 B/L upper and lower extremities; DTRs 2+ intact and symmetric  CHEST/LUNG: Clear to percussion bilaterally; No rales, rhonchi, wheezing, or rubs  HEART: Regular rate and rhythm; No murmurs, rubs, or gallops  ABDOMEN: Soft, Nontender, Nondistended; Bowel sounds present  EXTREMITIES:  2+ Peripheral Pulses, No clubbing, cyanosis, or edema  LYMPH: No lymphadenopathy noted  SKIN: No rashes or lesions

## 2024-10-14 NOTE — DISCHARGE NOTE NURSING/CASE MANAGEMENT/SOCIAL WORK - PATIENT PORTAL LINK FT
You can access the FollowMyHealth Patient Portal offered by [facility] by registering at the following website: [email or web address]. By joining [facility]’s FollowMyHealth portal, you will also be able to view your health information using other applications (apps) compatible with our system.

## 2024-10-14 NOTE — CONSULT NOTE ADULT - SUBJECTIVE AND OBJECTIVE BOX
INFECTIOUS DISEASE CONSULT NOTE    Patient is a 69y old  Female who presents with a chief complaint of Kidney stone (11 Oct 2024 06:55)    HPI:  69-year-old female PMH CAD S/p heart transplant 7 years ago, HTN,  anxiety, hypothyroidism, Hysterectomy, HLD presenting to ED for evaluation of generalized weakness, fever, chills, nausea, vomiting, abdominal pain over the past 2 days.  Son reporting patient has been increasingly weak.  Denies chest pain, shortness of breath and hematuria.  Pt admits to dysuria.  (11 Oct 2024 01:40)         Prior hospital charts reviewed [Yes]  Primary team notes reviewed [Yes]  Other consultant notes reviewed [Yes]    REVIEW OF SYSTEMS:      PAST MEDICAL & SURGICAL HISTORY:  Anxiety      Coronary artery disease      Hyperlipidemia      Hypothyroid      H/O heart transplant          SOCIAL HISTORY:  - Born in _____, migrated to  in 19XX  - Currently working as / Retired  - Lives with _____; no pets  - No recent travel  - Denies tobacco use  - Denies alcohol use  - Denies illicit drug use  - Currently sexually active, has one male/female sexual partner    FAMILY HISTORY:      Allergies:  No Known Allergies      ANTIMICROBIALS:  cefTRIAXone   IVPB 2000 every 24 hours      ANTIMICROBIALS (past 90 days):  MEDICATIONS  (STANDING):    cefTRIAXone   IVPB   100 mL/Hr IV Intermittent (10-14-24 @ 12:04)   100 mL/Hr IV Intermittent (10-13-24 @ 13:48)    piperacillin/tazobactam IVPB..   25 mL/Hr IV Intermittent (10-13-24 @ 05:18)   25 mL/Hr IV Intermittent (10-12-24 @ 21:25)   25 mL/Hr IV Intermittent (10-12-24 @ 13:44)   25 mL/Hr IV Intermittent (10-12-24 @ 05:59)   25 mL/Hr IV Intermittent (10-11-24 @ 21:27)   25 mL/Hr IV Intermittent (10-11-24 @ 13:20)   25 mL/Hr IV Intermittent (10-11-24 @ 06:12)    piperacillin/tazobactam IVPB...   200 mL/Hr IV Intermittent (10-11-24 @ 00:18)        OTHER MEDS:   MEDICATIONS  (STANDING):  albuterol    0.083% 2.5 every 6 hours PRN  aspirin  chewable 81 daily  atorvastatin 10 at bedtime  enoxaparin Injectable 40 every 24 hours  famotidine    Tablet 20 daily  levothyroxine 137 daily  mycophenolic acid  two times a day  predniSONE   Tablet 2.5 daily  rosuvastatin 20 at bedtime  tacrolimus 1.5 two times a day      VITALS:  Vital Signs Last 24 Hrs  T(F): 97.5 (10-14-24 @ 07:01), Max: 103.7 (10-10-24 @ 23:31)    Vital Signs Last 24 Hrs  HR: 74 (10-14-24 @ 07:07) (70 - 85)  BP: 150/81 (10-14-24 @ 07:07) (148/64 - 150/81)  RR: 18 (10-14-24 @ 07:07)  SpO2: 95% (10-14-24 @ 05:20) (95% - 98%)  Wt(kg): --    EXAM:    Labs:                        11.6   20.83 )-----------( 163      ( 13 Oct 2024 06:03 )             35.9     10-13    142  |  110  |  19  ----------------------------<  202[H]  3.8   |  22  |  0.7    Ca    7.0[L]      13 Oct 2024 06:03  Phos  2.2     10-13  Mg     2.2     10-13    TPro  4.6[L]  /  Alb  2.8[L]  /  TBili  0.3  /  DBili  x   /  AST  22  /  ALT  25  /  AlkPhos  65  10-13      WBC Trend:  WBC Count: 20.83 (10-13-24 @ 06:03)  WBC Count: 24.84 (10-12-24 @ 09:00)  WBC Count: 20.35 (10-12-24 @ 06:16)  WBC Count: 2.59 (10-10-24 @ 23:49)      Auto Neutrophil #: 18.96 K/uL (10-13-24 @ 06:03)  Band Neutrophils %: 23.0 % (10-13-24 @ 06:03)  Auto Neutrophil #: 2.22 K/uL (10-10-24 @ 23:49)      Creatine Trend:  Creatinine: 0.7 (10-13)  Creatinine: 0.9 (10-12)  Creatinine: 1.1 (10-10)      Liver Biochemical Testing Trend:  Alanine Aminotransferase (ALT/SGPT): 25 (10-13)  Alanine Aminotransferase (ALT/SGPT): 17 (10-12)  Alanine Aminotransferase (ALT/SGPT): 19 (10-10)  Aspartate Aminotransferase (AST/SGOT): 22 (10-13-24 @ 06:03)  Aspartate Aminotransferase (AST/SGOT): 17 (10-12-24 @ 06:16)  Aspartate Aminotransferase (AST/SGOT): 25 (10-10-24 @ 23:49)  Bilirubin Total: 0.3 (10-13)  Bilirubin Total: 0.3 (10-12)  Bilirubin Total: 1.3 (10-10)      Trend LDH      Auto Eosinophil %: 0.0 % (10-13-24 @ 06:03)      Urinalysis Basic - ( 13 Oct 2024 06:03 )    Color: x / Appearance: x / SG: x / pH: x  Gluc: 202 mg/dL / Ketone: x  / Bili: x / Urobili: x   Blood: x / Protein: x / Nitrite: x   Leuk Esterase: x / RBC: x / WBC x   Sq Epi: x / Non Sq Epi: x / Bacteria: x          MICROBIOLOGY:        Culture - Urine (collected 11 Oct 2024 01:45)  Source: Clean Catch Clean Catch (Midstream)  Preliminary Report (13 Oct 2024 04:14):    10,000 - 49,000 CFU/mL Escherichia coli    Culture - Blood (collected 10 Oct 2024 23:49)  Source: .Blood BLOOD  Gram Stain (11 Oct 2024 19:33):    Growth in anaerobic bottle: Gram Negative Rods    Growth in aerobic bottle: Gram Negative Rods  Final Report (13 Oct 2024 10:01):    Growth in aerobic and anaerobic bottles: Escherichia coli    Direct identification is available within approximately 3-5    hours either by Blood Panel Multiplexed PCR or Direct    MALDI-TOF. Details: [email or web address]  Organism: Blood Culture PCR  Escherichia coli (13 Oct 2024 10:01)  Organism: Escherichia coli (13 Oct 2024 10:01)      Method Type: KAITLIN      -  Ampicillin: R >16 These ampicillin results predict results for amoxicillin      -  Ampicillin/Sulbactam: I 16/8      -  Aztreonam: S <=4      -  Cefazolin: S <=2      -  Cefepime: S <=2      -  Cefoxitin: S <=8      -  Ceftriaxone: S <=1      -  Ciprofloxacin: S <=0.25      -  Ertapenem: S <=0.5      -  Gentamicin: S <=2      -  Imipenem: S <=1      -  Levofloxacin: S <=0.5      -  Meropenem: S <=1      -  Piperacillin/Tazobactam: S <=8      -  Tobramycin: S <=2      -  Trimethoprim/Sulfamethoxazole: S <=0.5/9.5  Organism: Blood Culture PCR (13 Oct 2024 10:01)      Method Type: PCR      -  Escherichia coli: Detec    Culture - Blood (collected 10 Oct 2024 23:49)  Source: .Blood BLOOD  Gram Stain (11 Oct 2024 19:32):    Growth in aerobic and anaerobic bottles: Gram Negative Rods  Final Report (13 Oct 2024 10:04):    Growth in aerobic and anaerobic bottles: Escherichia coli    See previous culture [id number]      Lactate, Blood: 1.0 (10-12 @ 06:16)          RADIOLOGY:  imaging below personally reviewed    < from: Xray Chest 1 View- PORTABLE-Routine (Xray Chest 1 View- PORTABLE-Routine .) (10.13.24 @ 08:51) >    IMPRESSION:  1.  Anterior diaphragmatic hernia.  2.  Stable low lung volumes, otherwise no radiographic evidence of acute   cardiopulmonary disease.    < end of copied text >    < from: CT Abdomen and Pelvis w/ IV Cont (10.11.24 @ 00:43) >  IMPRESSION:    Mild right hydroureteronephrosis secondary to an obstructing calculus at   the UVJ measuring 2 x 3 x 3 mm..      < end of copied text >   INFECTIOUS DISEASE CONSULT NOTE    Patient is a 69y old  Female who presents with a chief complaint of Kidney stone (11 Oct 2024 06:55)    HPI:  69-year-old female PMH CAD S/p heart transplant 7 years ago, HTN,  anxiety, hypothyroidism, Hysterectomy, HLD presenting to ED for evaluation of generalized weakness, fever, chills, nausea, vomiting, abdominal pain over the past 2 days.  Son reporting patient has been increasingly weak.  Denies chest pain, shortness of breath and hematuria.  Pt admits to dysuria.  (11 Oct 2024 01:40)     Prior hospital charts reviewed [Yes]  Primary team notes reviewed [Yes]  Other consultant notes reviewed [Yes]    REVIEW OF SYSTEMS:  CONSTITUTIONAL: No fever or chills  HEAD: No lesion on scalp  EYES: No visual disturbance  ENT: No sore throat  RESPIRATORY: No cough, no shortness of breath  CARDIOVASCULAR: No chest pain or palpitations  GASTROINTESTINAL: No abdominal or epigastric pain  GENITOURINARY: No dysuria  NEUROLOGICAL: No headache/dizziness  MUSCULOSKELETAL: No joint pain, erythema, or swelling; no back pain  SKIN: No itching, rashes  All other ROS negative except noted above      PAST MEDICAL & SURGICAL HISTORY:  Anxiety      Coronary artery disease      Hyperlipidemia      Hypothyroid      H/O heart transplant          SOCIAL HISTORY:  - No recent travel  - Denies tobacco use  - Denies alcohol use  - Denies illicit drug use    FAMILY HISTORY:  Father, , CAD    Allergies:  No Known Allergies      ANTIMICROBIALS:  cefTRIAXone   IVPB 2000 every 24 hours      ANTIMICROBIALS (past 90 days):  MEDICATIONS  (STANDING):    cefTRIAXone   IVPB   100 mL/Hr IV Intermittent (10-14-24 @ 12:04)   100 mL/Hr IV Intermittent (10-13-24 @ 13:48)    piperacillin/tazobactam IVPB..   25 mL/Hr IV Intermittent (10-13-24 @ 05:18)   25 mL/Hr IV Intermittent (10-12-24 @ 21:25)   25 mL/Hr IV Intermittent (10-12-24 @ 13:44)   25 mL/Hr IV Intermittent (10-12-24 @ 05:59)   25 mL/Hr IV Intermittent (10-11-24 @ 21:27)   25 mL/Hr IV Intermittent (10-11-24 @ 13:20)   25 mL/Hr IV Intermittent (10-11-24 @ 06:12)    piperacillin/tazobactam IVPB...   200 mL/Hr IV Intermittent (10-11-24 @ 00:18)        OTHER MEDS:   MEDICATIONS  (STANDING):  albuterol    0.083% 2.5 every 6 hours PRN  aspirin  chewable 81 daily  atorvastatin 10 at bedtime  enoxaparin Injectable 40 every 24 hours  famotidine    Tablet 20 daily  levothyroxine 137 daily  mycophenolic acid  two times a day  predniSONE   Tablet 2.5 daily  rosuvastatin 20 at bedtime  tacrolimus 1.5 two times a day      VITALS:  Vital Signs Last 24 Hrs  T(F): 97.5 (10-14-24 @ 07:01), Max: 103.7 (10-10-24 @ 23:31)    Vital Signs Last 24 Hrs  HR: 74 (10-14-24 @ 07:07) (70 - 85)  BP: 150/81 (10-14-24 @ 07:07) (148/64 - 150/81)  RR: 18 (10-14-24 @ 07:07)  SpO2: 95% (10-14-24 @ 05:20) (95% - 98%)  Wt(kg): --    EXAM:  GENERAL: NAD, lying in bed  HEAD: No head lesions  EYES: Conjunctiva pink and cornea white  EAR, NOSE, MOUTH, THROAT: Normal external ears and nose, no discharges; moist mucous membranes  NECK: Supple, nontender to palpation; no JVD  RESPIRATORY: Clear to auscultation bilaterally  CARDIOVASCULAR: S1 S2  GASTROINTESTINAL: Soft, nontender, nondistended; normoactive bowel sounds  GENITOURINARY: No guevara catheter, no CVA tenderness  EXTREMITIES: No clubbing, cyanosis, or petal edema  NERVOUS SYSTEM: Alert and oriented to person, time, place and situation, speech clear. No focal deficits   MUSCULOSKELETAL: No joint erythema, swelling or pain  SKIN: No rashes or lesions, no superficial thrombophlebitis  PSYCH: Normal affect      Labs:                        11.6   20.83 )-----------( 163      ( 13 Oct 2024 06:03 )             35.9     10-13    142  |  110  |  19  ----------------------------<  202[H]  3.8   |  22  |  0.7    Ca    7.0[L]      13 Oct 2024 06:03  Phos  2.2     10-13  Mg     2.2     10-    TPro  4.6[L]  /  Alb  2.8[L]  /  TBili  0.3  /  DBili  x   /  AST  22  /  ALT  25  /  AlkPhos  65  10-13      WBC Trend:  WBC Count: 20.83 (10-13-24 @ 06:03)  WBC Count: 24.84 (10-12-24 @ 09:00)  WBC Count: 20.35 (10-12-24 @ 06:16)  WBC Count: 2.59 (10-10-24 @ 23:49)      Auto Neutrophil #: 18.96 K/uL (10-13-24 @ 06:03)  Band Neutrophils %: 23.0 % (10-13-24 @ 06:03)  Auto Neutrophil #: 2.22 K/uL (10-10-24 @ 23:49)      Creatine Trend:  Creatinine: 0.7 (10-13)  Creatinine: 0.9 (10-12)  Creatinine: 1.1 (10-10)      Liver Biochemical Testing Trend:  Alanine Aminotransferase (ALT/SGPT): 25 (10-13)  Alanine Aminotransferase (ALT/SGPT): 17 (10-12)  Alanine Aminotransferase (ALT/SGPT): 19 (10-10)  Aspartate Aminotransferase (AST/SGOT): 22 (10-13-24 @ 06:03)  Aspartate Aminotransferase (AST/SGOT): 17 (10-12-24 @ 06:16)  Aspartate Aminotransferase (AST/SGOT): 25 (10-10-24 @ 23:49)  Bilirubin Total: 0.3 (10-13)  Bilirubin Total: 0.3 (10-12)  Bilirubin Total: 1.3 (1010)      Trend LDH      Auto Eosinophil %: 0.0 % (10-13-24 @ 06:03)      Urinalysis Basic - ( 13 Oct 2024 06:03 )    Color: x / Appearance: x / SG: x / pH: x  Gluc: 202 mg/dL / Ketone: x  / Bili: x / Urobili: x   Blood: x / Protein: x / Nitrite: x   Leuk Esterase: x / RBC: x / WBC x   Sq Epi: x / Non Sq Epi: x / Bacteria: x          MICROBIOLOGY:        Culture - Urine (collected 11 Oct 2024 01:45)  Source: Clean Catch Clean Catch (Midstream)  Preliminary Report (13 Oct 2024 04:14):    10,000 - 49,000 CFU/mL Escherichia coli    Culture - Blood (collected 10 Oct 2024 23:49)  Source: .Blood BLOOD  Gram Stain (11 Oct 2024 19:33):    Growth in anaerobic bottle: Gram Negative Rods    Growth in aerobic bottle: Gram Negative Rods  Final Report (13 Oct 2024 10:01):    Growth in aerobic and anaerobic bottles: Escherichia coli    Direct identification is available within approximately 3-5    hours either by Blood Panel Multiplexed PCR or Direct    MALDI-TOF. Details: [email or web address]  Organism: Blood Culture PCR  Escherichia coli (13 Oct 2024 10:01)  Organism: Escherichia coli (13 Oct 2024 10:01)      Method Type: KAITLIN      -  Ampicillin: R >16 These ampicillin results predict results for amoxicillin      -  Ampicillin/Sulbactam: I 16/8      -  Aztreonam: S <=4      -  Cefazolin: S <=2      -  Cefepime: S <=2      -  Cefoxitin: S <=8      -  Ceftriaxone: S <=1      -  Ciprofloxacin: S <=0.25      -  Ertapenem: S <=0.5      -  Gentamicin: S <=2      -  Imipenem: S <=1      -  Levofloxacin: S <=0.5      -  Meropenem: S <=1      -  Piperacillin/Tazobactam: S <=8      -  Tobramycin: S <=2      -  Trimethoprim/Sulfamethoxazole: S <=0.5/9.5  Organism: Blood Culture PCR (13 Oct 2024 10:01)      Method Type: PCR      -  Escherichia coli: Detec    Culture - Blood (collected 10 Oct 2024 23:49)  Source: .Blood BLOOD  Gram Stain (11 Oct 2024 19:32):    Growth in aerobic and anaerobic bottles: Gram Negative Rods  Final Report (13 Oct 2024 10:04):    Growth in aerobic and anaerobic bottles: Escherichia coli    See previous culture [id number]      Lactate, Blood: 1.0 (10-12 @ 06:16)          RADIOLOGY:  imaging below personally reviewed    < from: Xray Chest 1 View- PORTABLE-Routine (Xray Chest 1 View- PORTABLE-Routine .) (10.13.24 @ 08:51) >    IMPRESSION:  1.  Anterior diaphragmatic hernia.  2.  Stable low lung volumes, otherwise no radiographic evidence of acute   cardiopulmonary disease.    < end of copied text >    < from: CT Abdomen and Pelvis w/ IV Cont (10.11.24 @ 00:43) >  IMPRESSION:    Mild right hydroureteronephrosis secondary to an obstructing calculus at   the UVJ measuring 2 x 3 x 3 mm..      < end of copied text >

## 2024-10-14 NOTE — DISCHARGE NOTE PROVIDER - CARE PROVIDER_API CALL
RENA ROTHMAN  [address] [place] [address]  Phone: [phone]  Fax: [phone]  Follow Up Time: 1 week    Mukesh Merino  Urology  [address] [place] [id number]  Phone: [phone]  Fax: [phone]  Follow Up Time: 1 week

## 2024-10-15 LAB
-  AMOXICILLIN/CLAVULANIC ACID: SIGNIFICANT CHANGE UP
-  AMPICILLIN/SULBACTAM: SIGNIFICANT CHANGE UP
-  AMPICILLIN: SIGNIFICANT CHANGE UP
-  AZTREONAM: SIGNIFICANT CHANGE UP
-  CEFAZOLIN: SIGNIFICANT CHANGE UP
-  CEFEPIME: SIGNIFICANT CHANGE UP
-  CEFOXITIN: SIGNIFICANT CHANGE UP
-  CEFTRIAXONE: SIGNIFICANT CHANGE UP
-  CEFUROXIME: SIGNIFICANT CHANGE UP
-  CIPROFLOXACIN: SIGNIFICANT CHANGE UP
-  ERTAPENEM: SIGNIFICANT CHANGE UP
-  GENTAMICIN: SIGNIFICANT CHANGE UP
-  IMIPENEM: SIGNIFICANT CHANGE UP
-  LEVOFLOXACIN: SIGNIFICANT CHANGE UP
-  MEROPENEM: SIGNIFICANT CHANGE UP
-  NITROFURANTOIN: SIGNIFICANT CHANGE UP
-  PIPERACILLIN/TAZOBACTAM: SIGNIFICANT CHANGE UP
-  TOBRAMYCIN: SIGNIFICANT CHANGE UP
-  TRIMETHOPRIM/SULFAMETHOXAZOLE: SIGNIFICANT CHANGE UP
CMV IGG FLD QL: 8.2 U/ML — HIGH
CMV IGG SERPL-IMP: POSITIVE
CULTURE RESULTS: ABNORMAL
EBV EA AB SER IA-ACNC: <5 U/ML — SIGNIFICANT CHANGE UP
EBV EA AB TITR SER IF: POSITIVE
EBV EA IGG SER-ACNC: NEGATIVE — SIGNIFICANT CHANGE UP
EBV NA IGG SER IA-ACNC: 162 U/ML — HIGH
EBV PATRN SPEC IB-IMP: SIGNIFICANT CHANGE UP
EBV VCA IGG AVIDITY SER QL IA: POSITIVE
EBV VCA IGM SER IA-ACNC: 382 U/ML — HIGH
EBV VCA IGM SER IA-ACNC: <10 U/ML — SIGNIFICANT CHANGE UP
EBV VCA IGM TITR FLD: NEGATIVE — SIGNIFICANT CHANGE UP
METHOD TYPE: SIGNIFICANT CHANGE UP
ORGANISM # SPEC MICROSCOPIC CNT: ABNORMAL
ORGANISM # SPEC MICROSCOPIC CNT: SIGNIFICANT CHANGE UP
SPECIMEN SOURCE: SIGNIFICANT CHANGE UP
T GONDII IGG SER QL: <3 IU/ML — SIGNIFICANT CHANGE UP
T GONDII IGG SER QL: NEGATIVE — SIGNIFICANT CHANGE UP
TACROLIMUS SERPL-MCNC: 7.2 NG/ML — SIGNIFICANT CHANGE UP

## 2024-10-17 ENCOUNTER — NON-APPOINTMENT (OUTPATIENT)
Age: 69
End: 2024-10-17

## 2024-10-17 PROBLEM — Z00.00 ENCOUNTER FOR PREVENTIVE HEALTH EXAMINATION: Status: ACTIVE | Noted: 2024-10-17

## 2024-10-21 DIAGNOSIS — I10 ESSENTIAL (PRIMARY) HYPERTENSION: ICD-10-CM

## 2024-10-21 DIAGNOSIS — I25.10 ATHEROSCLEROTIC HEART DISEASE OF NATIVE CORONARY ARTERY WITHOUT ANGINA PECTORIS: ICD-10-CM

## 2024-10-21 DIAGNOSIS — R65.21 SEVERE SEPSIS WITH SEPTIC SHOCK: ICD-10-CM

## 2024-10-21 DIAGNOSIS — N13.6 PYONEPHROSIS: ICD-10-CM

## 2024-10-21 DIAGNOSIS — E78.5 HYPERLIPIDEMIA, UNSPECIFIED: ICD-10-CM

## 2024-10-21 DIAGNOSIS — Z79.82 LONG TERM (CURRENT) USE OF ASPIRIN: ICD-10-CM

## 2024-10-21 DIAGNOSIS — Z79.02 LONG TERM (CURRENT) USE OF ANTITHROMBOTICS/ANTIPLATELETS: ICD-10-CM

## 2024-10-21 DIAGNOSIS — Z79.52 LONG TERM (CURRENT) USE OF SYSTEMIC STEROIDS: ICD-10-CM

## 2024-10-21 DIAGNOSIS — Z94.1 HEART TRANSPLANT STATUS: ICD-10-CM

## 2024-10-21 DIAGNOSIS — A41.51 SEPSIS DUE TO ESCHERICHIA COLI [E. COLI]: ICD-10-CM

## 2024-10-21 DIAGNOSIS — E03.9 HYPOTHYROIDISM, UNSPECIFIED: ICD-10-CM

## 2024-10-24 ENCOUNTER — NON-APPOINTMENT (OUTPATIENT)
Age: 69
End: 2024-10-24

## 2024-10-24 ENCOUNTER — APPOINTMENT (OUTPATIENT)
Dept: UROLOGY | Facility: CLINIC | Age: 69
End: 2024-10-24
Payer: MEDICARE

## 2024-10-24 VITALS
DIASTOLIC BLOOD PRESSURE: 82 MMHG | SYSTOLIC BLOOD PRESSURE: 136 MMHG | OXYGEN SATURATION: 97 % | RESPIRATION RATE: 18 BRPM | HEART RATE: 98 BPM

## 2024-10-24 DIAGNOSIS — N20.1 CALCULUS OF URETER: ICD-10-CM

## 2024-10-24 PROCEDURE — 99214 OFFICE O/P EST MOD 30 MIN: CPT

## 2024-11-12 ENCOUNTER — OUTPATIENT (OUTPATIENT)
Dept: OUTPATIENT SERVICES | Facility: HOSPITAL | Age: 69
LOS: 1 days | End: 2024-11-12
Payer: MEDICARE

## 2024-11-12 VITALS
OXYGEN SATURATION: 98 % | DIASTOLIC BLOOD PRESSURE: 67 MMHG | SYSTOLIC BLOOD PRESSURE: 109 MMHG | RESPIRATION RATE: 16 BRPM | HEIGHT: 61 IN | HEART RATE: 93 BPM | WEIGHT: 169.98 LBS | TEMPERATURE: 98 F

## 2024-11-12 DIAGNOSIS — Z98.890 OTHER SPECIFIED POSTPROCEDURAL STATES: Chronic | ICD-10-CM

## 2024-11-12 DIAGNOSIS — Z94.1 HEART TRANSPLANT STATUS: Chronic | ICD-10-CM

## 2024-11-12 DIAGNOSIS — Z01.818 ENCOUNTER FOR OTHER PREPROCEDURAL EXAMINATION: ICD-10-CM

## 2024-11-12 DIAGNOSIS — N20.1 CALCULUS OF URETER: ICD-10-CM

## 2024-11-12 LAB
APPEARANCE UR: ABNORMAL
BASOPHILS # BLD AUTO: 0.06 K/UL — SIGNIFICANT CHANGE UP (ref 0–0.2)
BASOPHILS NFR BLD AUTO: 0.5 % — SIGNIFICANT CHANGE UP (ref 0–1)
BILIRUB UR-MCNC: ABNORMAL
COLOR SPEC: ABNORMAL
DIFF PNL FLD: ABNORMAL
EOSINOPHIL # BLD AUTO: 0.12 K/UL — SIGNIFICANT CHANGE UP (ref 0–0.7)
EOSINOPHIL NFR BLD AUTO: 1.1 % — SIGNIFICANT CHANGE UP (ref 0–8)
GLUCOSE UR QL: NEGATIVE MG/DL — SIGNIFICANT CHANGE UP
HCT VFR BLD CALC: 39.3 % — SIGNIFICANT CHANGE UP (ref 37–47)
HGB BLD-MCNC: 12.6 G/DL — SIGNIFICANT CHANGE UP (ref 12–16)
IMM GRANULOCYTES NFR BLD AUTO: 0.4 % — HIGH (ref 0.1–0.3)
KETONES UR-MCNC: NEGATIVE MG/DL — SIGNIFICANT CHANGE UP
LEUKOCYTE ESTERASE UR-ACNC: ABNORMAL
LYMPHOCYTES # BLD AUTO: 27.6 % — SIGNIFICANT CHANGE UP (ref 20.5–51.1)
LYMPHOCYTES # BLD AUTO: 3.02 K/UL — SIGNIFICANT CHANGE UP (ref 1.2–3.4)
MCHC RBC-ENTMCNC: 29.8 PG — SIGNIFICANT CHANGE UP (ref 27–31)
MCHC RBC-ENTMCNC: 32.1 G/DL — SIGNIFICANT CHANGE UP (ref 32–37)
MCV RBC AUTO: 92.9 FL — SIGNIFICANT CHANGE UP (ref 81–99)
MONOCYTES # BLD AUTO: 0.78 K/UL — HIGH (ref 0.1–0.6)
MONOCYTES NFR BLD AUTO: 7.1 % — SIGNIFICANT CHANGE UP (ref 1.7–9.3)
NEUTROPHILS # BLD AUTO: 6.92 K/UL — HIGH (ref 1.4–6.5)
NEUTROPHILS NFR BLD AUTO: 63.3 % — SIGNIFICANT CHANGE UP (ref 42.2–75.2)
NITRITE UR-MCNC: POSITIVE
NRBC # BLD: 0 /100 WBCS — SIGNIFICANT CHANGE UP (ref 0–0)
PH UR: 5.5 — SIGNIFICANT CHANGE UP (ref 5–8)
PLATELET # BLD AUTO: 254 K/UL — SIGNIFICANT CHANGE UP (ref 130–400)
PMV BLD: 10.9 FL — HIGH (ref 7.4–10.4)
PROT UR-MCNC: 300 MG/DL
RBC # BLD: 4.23 M/UL — SIGNIFICANT CHANGE UP (ref 4.2–5.4)
RBC # FLD: 13.9 % — SIGNIFICANT CHANGE UP (ref 11.5–14.5)
SP GR SPEC: >1.03 — HIGH (ref 1–1.03)
UROBILINOGEN FLD QL: 1 MG/DL — SIGNIFICANT CHANGE UP (ref 0.2–1)
WBC # BLD: 10.94 K/UL — HIGH (ref 4.8–10.8)
WBC # FLD AUTO: 10.94 K/UL — HIGH (ref 4.8–10.8)

## 2024-11-12 PROCEDURE — 36415 COLL VENOUS BLD VENIPUNCTURE: CPT

## 2024-11-12 PROCEDURE — 87186 SC STD MICRODIL/AGAR DIL: CPT

## 2024-11-12 PROCEDURE — 93010 ELECTROCARDIOGRAM REPORT: CPT

## 2024-11-12 PROCEDURE — 87086 URINE CULTURE/COLONY COUNT: CPT

## 2024-11-12 PROCEDURE — 93005 ELECTROCARDIOGRAM TRACING: CPT

## 2024-11-12 PROCEDURE — 99214 OFFICE O/P EST MOD 30 MIN: CPT | Mod: 25

## 2024-11-12 PROCEDURE — 87077 CULTURE AEROBIC IDENTIFY: CPT

## 2024-11-12 PROCEDURE — 81001 URINALYSIS AUTO W/SCOPE: CPT

## 2024-11-12 PROCEDURE — 85025 COMPLETE CBC W/AUTO DIFF WBC: CPT

## 2024-11-12 NOTE — H&P PST ADULT - NSICDXPASTSURGICALHX_GEN_ALL_CORE_FT
PAST SURGICAL HISTORY:  H/O heart transplant      PAST SURGICAL HISTORY:  H/O heart transplant     S/P primary angioplasty

## 2024-11-12 NOTE — H&P PST ADULT - REASON FOR ADMISSION
Case Type: OP Block TimeSuite: [facility] Mann Edmondson  Confirmed Surgery DateTime: 11-  Procedure: CYSTOSCOPY RIGHT URETEROSCOPY LASER LITHOTRIPSY OF STONE WITH STENT PLACEMENT  ERP?: NoLaterality: RightLength of Procedure: 30 Minutes  Anesthesia Type: General

## 2024-11-12 NOTE — H&P PST ADULT - CARDIOVASCULAR
normal/regular rate and rhythm/S1 S2 present/no gallops/no rub/murmur HEART TRANSPLANT/normal/regular rate and rhythm/S1 S2 present/no gallops/no rub/murmur

## 2024-11-12 NOTE — H&P PST ADULT - HISTORY OF PRESENT ILLNESS
70 yo female presents for PAST in preparation for CYSTOSCOPY RIGHT URETEROSCOPY LASER LITHOTRIPSY OF STONE WITH STENT PLACEMENT.      the pt has presented with worsening symptoms of urgency and frequency at night,seeking medical attention for urology cencerns. Also reports eleveted PSA, family history of prostate/bladder cancer  PATIENT/GUARDIAN CURRENTLY DENIES CHEST PAIN  SHORTNESS OF BREATH  PALPITATIONS,  DYSURIA, OR UPPER RESPIRATORY INFECTION IN PAST 2 WEEKS    Anesthesia Alert  NO--Difficult Airway  NO--History of neck surgery or radiation  NO--Limited ROM of neck  NO--History of Malignant hyperthermia  NO--No personal or family history of Pseudocholinesterase deficiency.  NO--Prior Anesthesia Complication  NO--Latex Allergy  NO--Loose teeth  NO--History of Rheumatoid Arthritis  NO--Bleeding risk  NO--MAXIM  YES--HEART TRANSPLANT RECIPIENT     PT/GUARDIAN DENIES ANY RASHES, ABRASION, OR OPEN WOUNDS OR CUTS    AS PER THE PT/GUARDIAN, THIS IS HIS/HER COMPLETE MEDICAL AND SURGICAL HX, INCLUDING MEDICATIONS PRESCRIBED AND OVER THE COUNTER  Patient/guardian understands the instructions and was given the opportunity to ask questions and have them answered.  pt/guardian denies any suicidal ideation or thoughts, pt states not a threat to self or others   70 yo female PAST MEDICAL & SURGICAL HISTORY: Anxiety, CAD, Hyperlipidemia, Hypothyroid, Kidney stone, cardiac murmur, Status post cardiac surgery  -heart transplant, hematuria  presents for PAST in preparation for CYSTOSCOPY RIGHT URETEROSCOPY LASER LITHOTRIPSY OF STONE WITH STENT PLACEMENT. Per pt she was c/o lingering abdominal symptoms as she thought was stomach virus for 10 days associated with nausea, vomiting  and chills which prompted her to seek ED visit. In ED  she was diagnosed with sepsis due to blocked kidney stone and hospitalized from 10/9 thru 10/14/24. Now pt reports suprapubic pain and hematuria. Pain is rated as 5/10 described as cramps.  Furthermore, the pt c/o  worsening symptoms of urgency and frequency at night and day.   PATIENT/GUARDIAN CURRENTLY DENIES CHEST PAIN  SHORTNESS OF BREATH  PALPITATIONS,  DYSURIA, OR UPPER RESPIRATORY INFECTION IN PAST 2 WEEKS    Anesthesia Alert  YES--Difficult Airway, classIV  NO--History of neck surgery or radiation  NO--Limited ROM of neck  NO--History of Malignant hyperthermia  NO--No personal or family history of Pseudocholinesterase deficiency.  NO--Prior Anesthesia Complication  NO--Latex Allergy  NO--Loose teeth  NO--History of Rheumatoid Arthritis  YES--Bleeding risk, aspirin  NO--MAXIM  YES--HEART TRANSPLANT RECIPIENT ( will request clearance/ECHO)  YES --DAILY STEROIDS 2.5 mg       DASI 9.89  Revised Cardiac Risk Index for Pre-Operative Risk from [email or web address]  on 11/12/2024  ** All calculations should be rechecked by clinician prior to use **    RESULT SUMMARY:  2 points  Class III Risk    10.1 %  30-day risk of death, MI, or cardiac arrest    From [place] 2017. These numbers are higher than those from the original study ([place] 1999). See Evidence for details.      INPUTS:  Elevated-risk surgery —> 0 = No  History of ischemic heart disease —> 1 = Yes  History of congestive heart failure —> 0 = No  History of cerebrovascular disease —> 0 = No  Pre-operative treatment with insulin —> 1 = Yes  Pre-operative creatinine >2 mg/dL / 176.8 µmol/L —> 0 = No    PT/GUARDIAN DENIES ANY RASHES, ABRASION, OR OPEN WOUNDS OR CUTS    AS PER THE PT/GUARDIAN, THIS IS HIS/HER COMPLETE MEDICAL AND SURGICAL HX, INCLUDING MEDICATIONS PRESCRIBED AND OVER THE COUNTER  Patient/guardian understands the instructions and was given the opportunity to ask questions and have them answered.  pt/guardian denies any suicidal ideation or thoughts, pt states not a threat to self or others   70 yo female PAST MEDICAL & SURGICAL HISTORY: Anxiety, CAD, Hyperlipidemia, Hypothyroid, Kidney stone, cardiac murmur, Status post cardiac surgery  -heart transplant in  2017turia  presents for PAST in preparation for CYSTOSCOPY RIGHT URETEROSCOPY LASER LITHOTRIPSY OF STONE WITH STENT PLACEMENT. Per pt she was c/o lingering abdominal symptoms as she thought was stomach virus for 10 days associated with nausea, vomiting  and chills which prompted her to seek ED visit. In ED  she was diagnosed with sepsis due to blocked kidney stone and hospitalized from 10/9 thru 10/14/24. Now pt reports suprapubic pain and hematuria. Pain is rated as 5/10 described as cramps.  Furthermore, the pt c/o  worsening symptoms of urgency and frequency at night and day.   PATIENT/GUARDIAN CURRENTLY DENIES CHEST PAIN  SHORTNESS OF BREATH  PALPITATIONS,  DYSURIA, OR UPPER RESPIRATORY INFECTION IN PAST 2 WEEKS    Anesthesia Alert  YES--Difficult Airway, classIV  NO--History of neck surgery or radiation  NO--Limited ROM of neck  NO--History of Malignant hyperthermia  NO--No personal or family history of Pseudocholinesterase deficiency.  NO--Prior Anesthesia Complication  NO--Latex Allergy  NO--Loose teeth  NO--History of Rheumatoid Arthritis  YES--Bleeding risk, aspirin  NO--MAXIM  YES--HEART TRANSPLANT RECIPIENT ( will request clearance/ECHO)  YES --DAILY STEROIDS 2.5 mg       DASI 9.89  Revised Cardiac Risk Index for Pre-Operative Risk from [email or web address]  on 11/12/2024  ** All calculations should be rechecked by clinician prior to use **    RESULT SUMMARY:  2 points  Class III Risk    10.1 %  30-day risk of death, MI, or cardiac arrest    From [place] 2017. These numbers are higher than those from the original study ([place] 1999). See Evidence for details.      INPUTS:  Elevated-risk surgery —> 0 = No  History of ischemic heart disease —> 1 = Yes  History of congestive heart failure —> 0 = No  History of cerebrovascular disease —> 0 = No  Pre-operative treatment with insulin —> 1 = Yes  Pre-operative creatinine >2 mg/dL / 176.8 µmol/L —> 0 = No    PT/GUARDIAN DENIES ANY RASHES, ABRASION, OR OPEN WOUNDS OR CUTS    AS PER THE PT/GUARDIAN, THIS IS HIS/HER COMPLETE MEDICAL AND SURGICAL HX, INCLUDING MEDICATIONS PRESCRIBED AND OVER THE COUNTER  Patient/guardian understands the instructions and was given the opportunity to ask questions and have them answered.  pt/guardian denies any suicidal ideation or thoughts, pt states not a threat to self or others

## 2024-11-12 NOTE — H&P PST ADULT - NSICDXPASTMEDICALHX_GEN_ALL_CORE_FT
PAST MEDICAL HISTORY:  Anxiety     Coronary artery disease     Hyperlipidemia     Hypothyroid      PAST MEDICAL HISTORY:  Anxiety     Coronary artery disease     H/O cardiac murmur     Hematuria     Hyperlipidemia     Hypothyroid     Kidney stone     Status post cardiac surgery      PAST MEDICAL HISTORY:  Anxiety     Coronary artery disease     H/O cardiac murmur     Hematuria     Hyperlipidemia     Hypothyroid     Kidney stone     Prediabetes     Status post cardiac surgery

## 2024-11-13 DIAGNOSIS — N20.1 CALCULUS OF URETER: ICD-10-CM

## 2024-11-13 DIAGNOSIS — Z01.818 ENCOUNTER FOR OTHER PREPROCEDURAL EXAMINATION: ICD-10-CM

## 2024-11-15 LAB
-  AMPICILLIN: SIGNIFICANT CHANGE UP
-  CIPROFLOXACIN: SIGNIFICANT CHANGE UP
-  LEVOFLOXACIN: SIGNIFICANT CHANGE UP
-  NITROFURANTOIN: SIGNIFICANT CHANGE UP
-  TETRACYCLINE: SIGNIFICANT CHANGE UP
-  VANCOMYCIN: SIGNIFICANT CHANGE UP
CULTURE RESULTS: ABNORMAL
METHOD TYPE: SIGNIFICANT CHANGE UP
ORGANISM # SPEC MICROSCOPIC CNT: ABNORMAL
ORGANISM # SPEC MICROSCOPIC CNT: SIGNIFICANT CHANGE UP
SPECIMEN SOURCE: SIGNIFICANT CHANGE UP

## 2024-11-18 ENCOUNTER — NON-APPOINTMENT (OUTPATIENT)
Age: 69
End: 2024-11-18

## 2024-11-18 RX ORDER — AMOXICILLIN AND CLAVULANATE POTASSIUM 500; 125 MG/1; MG/1
500-125 TABLET, FILM COATED ORAL
Qty: 14 | Refills: 0 | Status: ACTIVE | COMMUNITY
Start: 2024-11-18 | End: 1900-01-01

## 2024-11-20 ENCOUNTER — APPOINTMENT (OUTPATIENT)
Dept: UROLOGY | Facility: HOSPITAL | Age: 69
End: 2024-11-20

## 2024-11-20 ENCOUNTER — OUTPATIENT (OUTPATIENT)
Dept: OUTPATIENT SERVICES | Facility: HOSPITAL | Age: 69
LOS: 1 days | Discharge: HOME | End: 2024-11-20
Payer: MEDICARE

## 2024-11-20 ENCOUNTER — TRANSCRIPTION ENCOUNTER (OUTPATIENT)
Age: 69
End: 2024-11-20

## 2024-11-20 VITALS
RESPIRATION RATE: 18 BRPM | WEIGHT: 167.99 LBS | TEMPERATURE: 98 F | HEIGHT: 61 IN | OXYGEN SATURATION: 100 % | HEART RATE: 103 BPM | DIASTOLIC BLOOD PRESSURE: 81 MMHG | SYSTOLIC BLOOD PRESSURE: 130 MMHG

## 2024-11-20 VITALS — RESPIRATION RATE: 18 BRPM | SYSTOLIC BLOOD PRESSURE: 129 MMHG | HEART RATE: 71 BPM | DIASTOLIC BLOOD PRESSURE: 70 MMHG

## 2024-11-20 DIAGNOSIS — Z98.890 OTHER SPECIFIED POSTPROCEDURAL STATES: Chronic | ICD-10-CM

## 2024-11-20 DIAGNOSIS — N20.1 CALCULUS OF URETER: ICD-10-CM

## 2024-11-20 DIAGNOSIS — Z94.1 HEART TRANSPLANT STATUS: Chronic | ICD-10-CM

## 2024-11-20 PROCEDURE — 72170 X-RAY EXAM OF PELVIS: CPT

## 2024-11-20 PROCEDURE — 52351 CYSTOURETERO & OR PYELOSCOPE: CPT | Mod: RT

## 2024-11-20 PROCEDURE — C1769: CPT

## 2024-11-20 PROCEDURE — 82962 GLUCOSE BLOOD TEST: CPT

## 2024-11-20 RX ORDER — ONDANSETRON HYDROCHLORIDE 2 MG/ML
4 INJECTION, SOLUTION INTRAMUSCULAR; INTRAVENOUS ONCE
Refills: 0 | Status: DISCONTINUED | OUTPATIENT
Start: 2024-11-20 | End: 2024-11-20

## 2024-11-20 RX ORDER — HYDROMORPHONE HCL/0.9% NACL/PF 6 MG/30 ML
1 PATIENT CONTROLLED ANALGESIA SYRINGE INTRAVENOUS
Refills: 0 | Status: DISCONTINUED | OUTPATIENT
Start: 2024-11-20 | End: 2024-11-20

## 2024-11-20 RX ORDER — LEVOTHYROXINE SODIUM 88 MCG
1 TABLET ORAL
Refills: 0 | DISCHARGE

## 2024-11-20 RX ORDER — HYDROMORPHONE HCL/0.9% NACL/PF 6 MG/30 ML
0.5 PATIENT CONTROLLED ANALGESIA SYRINGE INTRAVENOUS
Refills: 0 | Status: DISCONTINUED | OUTPATIENT
Start: 2024-11-20 | End: 2024-11-20

## 2024-11-20 NOTE — ASU PATIENT PROFILE, ADULT - NSICDXPASTMEDICALHX_GEN_ALL_CORE_FT
PAST MEDICAL HISTORY:  Anxiety     Coronary artery disease     H/O cardiac murmur     Hematuria     Hyperlipidemia     Hypothyroid     Kidney stone     Prediabetes     Status post cardiac surgery cardiac stent x1

## 2024-11-20 NOTE — ASU DISCHARGE PLAN (ADULT/PEDIATRIC) - FINANCIAL ASSISTANCE
[facility] provides services at a reduced cost to those who are determined to be eligible through [facility]’s financial assistance program. Information regarding [facility]’s financial assistance program can be found by going to [email or web address] or by calling [phone].

## 2024-11-20 NOTE — BRIEF OPERATIVE NOTE - NSICDXBRIEFPROCEDURE_GEN_ALL_CORE_FT
PROCEDURES:  Cystoscopy with right ureteroscopy 20-Nov-2024 14:42:18  Mann Edmondson  Endoscopic retrograde pyelogram 20-Nov-2024 14:43:28  Mann Edmondson

## 2024-11-20 NOTE — ASU PATIENT PROFILE, ADULT - FALL HARM RISK - HARM RISK INTERVENTIONS

## 2024-11-20 NOTE — CHART NOTE - NSCHARTNOTEFT_GEN_A_CORE
PACU ANESTHESIA ADMISSION NOTE      Procedure:   Post op diagnosis:      ____  Intubated  TV:______       Rate: ______      FiO2: ______    _x___  Patent Airway    x____  Full return of protective reflexes    _x___  Full recovery from anesthesia / back to baseline     Vitals:   T:  97.1         R:18                  BP: 136/62                 Sat:   98                P: 68      Mental Status:  __x__ Awake   __x___ Alert   _____ Drowsy   _____ Sedated    Nausea/Vomiting:  __x__ NO  ______Yes,   See Post - Op Orders          Pain Scale (0-10):  __0___    Treatment: ____ None    ____ See Post - Op/PCA Orders    Post - Operative Fluids:   ____ Oral   _x___ See Post - Op Orders    Plan: Discharge:   __x__Home       _____Floor     _____Critical Care    _____  Other:_________________    Comments:

## 2024-11-20 NOTE — ASU DISCHARGE PLAN (ADULT/PEDIATRIC) - NS MD DC FALL RISK RISK
For information on Fall & Injury Prevention, visit: [email or web address] OR  [email or web address] OR  [email or web address]://www.cdc.gov/steadi/patient.html

## 2024-11-21 ENCOUNTER — NON-APPOINTMENT (OUTPATIENT)
Age: 69
End: 2024-11-21

## 2024-11-21 PROBLEM — R73.03 PREDIABETES: Chronic | Status: ACTIVE | Noted: 2024-11-12

## 2024-11-21 PROBLEM — N20.0 CALCULUS OF KIDNEY: Chronic | Status: ACTIVE | Noted: 2024-11-12

## 2024-11-21 PROBLEM — Z86.79 PERSONAL HISTORY OF OTHER DISEASES OF THE CIRCULATORY SYSTEM: Chronic | Status: ACTIVE | Noted: 2024-11-12

## 2024-11-21 PROBLEM — R31.9 HEMATURIA, UNSPECIFIED: Chronic | Status: ACTIVE | Noted: 2024-11-12

## 2024-11-27 DIAGNOSIS — N20.1 CALCULUS OF URETER: ICD-10-CM

## 2024-11-27 DIAGNOSIS — Z79.82 LONG TERM (CURRENT) USE OF ASPIRIN: ICD-10-CM

## 2024-11-27 DIAGNOSIS — I25.10 ATHEROSCLEROTIC HEART DISEASE OF NATIVE CORONARY ARTERY WITHOUT ANGINA PECTORIS: ICD-10-CM

## 2024-11-27 DIAGNOSIS — Z94.1 HEART TRANSPLANT STATUS: ICD-10-CM

## 2024-11-27 DIAGNOSIS — E03.9 HYPOTHYROIDISM, UNSPECIFIED: ICD-10-CM

## 2024-11-27 DIAGNOSIS — E78.5 HYPERLIPIDEMIA, UNSPECIFIED: ICD-10-CM

## 2025-01-02 ENCOUNTER — APPOINTMENT (OUTPATIENT)
Dept: UROLOGY | Facility: CLINIC | Age: 70
End: 2025-01-02
Payer: MEDICARE

## 2025-01-02 DIAGNOSIS — N20.1 CALCULUS OF URETER: ICD-10-CM

## 2025-01-02 LAB
BILIRUB UR QL STRIP: NORMAL
COLLECTION METHOD: NORMAL
GLUCOSE UR-MCNC: NORMAL
HCG UR QL: 0.2 EU/DL
HGB UR QL STRIP.AUTO: NORMAL
KETONES UR-MCNC: NORMAL
LEUKOCYTE ESTERASE UR QL STRIP: NORMAL
NITRITE UR QL STRIP: POSITIVE
PH UR STRIP: 5.5
PROT UR STRIP-MCNC: NORMAL
SP GR UR STRIP: >=1.03

## 2025-01-02 PROCEDURE — 99214 OFFICE O/P EST MOD 30 MIN: CPT
